# Patient Record
Sex: MALE | Race: WHITE | Employment: OTHER | URBAN - METROPOLITAN AREA
[De-identification: names, ages, dates, MRNs, and addresses within clinical notes are randomized per-mention and may not be internally consistent; named-entity substitution may affect disease eponyms.]

---

## 2019-07-29 ENCOUNTER — HOSPITAL ENCOUNTER (INPATIENT)
Age: 69
LOS: 4 days | Discharge: HOME HEALTH | DRG: 571 | End: 2019-08-02
Attending: EMERGENCY MEDICINE | Admitting: HOSPITALIST
Payer: MEDICARE

## 2019-07-29 ENCOUNTER — APPOINTMENT (OUTPATIENT)
Dept: GENERAL RADIOLOGY | Age: 69
DRG: 571 | End: 2019-07-29
Attending: EMERGENCY MEDICINE
Payer: MEDICARE

## 2019-07-29 DIAGNOSIS — S81.802A WOUND OF LEFT LEG, INITIAL ENCOUNTER: Primary | ICD-10-CM

## 2019-07-29 DIAGNOSIS — I48.21 PERMANENT ATRIAL FIBRILLATION (HCC): ICD-10-CM

## 2019-07-29 DIAGNOSIS — L03.116 CELLULITIS OF LEFT LEG: ICD-10-CM

## 2019-07-29 PROBLEM — L03.90 CELLULITIS: Status: ACTIVE | Noted: 2019-07-29

## 2019-07-29 PROBLEM — G47.33 OSA (OBSTRUCTIVE SLEEP APNEA): Status: ACTIVE | Noted: 2019-07-29

## 2019-07-29 PROBLEM — I48.91 A-FIB (HCC): Status: ACTIVE | Noted: 2019-07-29

## 2019-07-29 LAB
ALBUMIN SERPL-MCNC: 3.9 G/DL (ref 3.4–5)
ALBUMIN/GLOB SERPL: 1.2 {RATIO} (ref 0.8–1.7)
ALP SERPL-CCNC: 65 U/L (ref 45–117)
ALT SERPL-CCNC: 21 U/L (ref 16–61)
ANION GAP SERPL CALC-SCNC: 3 MMOL/L (ref 3–18)
AST SERPL-CCNC: 23 U/L (ref 10–38)
BASOPHILS # BLD: 0 K/UL (ref 0–0.1)
BASOPHILS NFR BLD: 0 % (ref 0–2)
BILIRUB DIRECT SERPL-MCNC: 0.2 MG/DL (ref 0–0.2)
BILIRUB SERPL-MCNC: 0.6 MG/DL (ref 0.2–1)
BUN SERPL-MCNC: 20 MG/DL (ref 7–18)
BUN/CREAT SERPL: 22 (ref 12–20)
CALCIUM SERPL-MCNC: 8.9 MG/DL (ref 8.5–10.1)
CHLORIDE SERPL-SCNC: 108 MMOL/L (ref 100–111)
CO2 SERPL-SCNC: 29 MMOL/L (ref 21–32)
CREAT SERPL-MCNC: 0.89 MG/DL (ref 0.6–1.3)
DIFFERENTIAL METHOD BLD: ABNORMAL
EOSINOPHIL # BLD: 0.1 K/UL (ref 0–0.4)
EOSINOPHIL NFR BLD: 1 % (ref 0–5)
ERYTHROCYTE [DISTWIDTH] IN BLOOD BY AUTOMATED COUNT: 13.5 % (ref 11.6–14.5)
GLOBULIN SER CALC-MCNC: 3.2 G/DL (ref 2–4)
GLUCOSE SERPL-MCNC: 100 MG/DL (ref 74–99)
HCT VFR BLD AUTO: 38.5 % (ref 36–48)
HGB BLD-MCNC: 12.7 G/DL (ref 13–16)
LYMPHOCYTES # BLD: 1.4 K/UL (ref 0.9–3.6)
LYMPHOCYTES NFR BLD: 16 % (ref 21–52)
MCH RBC QN AUTO: 31.7 PG (ref 24–34)
MCHC RBC AUTO-ENTMCNC: 33 G/DL (ref 31–37)
MCV RBC AUTO: 96 FL (ref 74–97)
MONOCYTES # BLD: 0.9 K/UL (ref 0.05–1.2)
MONOCYTES NFR BLD: 11 % (ref 3–10)
NEUTS SEG # BLD: 6.1 K/UL (ref 1.8–8)
NEUTS SEG NFR BLD: 72 % (ref 40–73)
PLATELET # BLD AUTO: 169 K/UL (ref 135–420)
PMV BLD AUTO: 9.3 FL (ref 9.2–11.8)
POTASSIUM SERPL-SCNC: 4 MMOL/L (ref 3.5–5.5)
PROT SERPL-MCNC: 7.1 G/DL (ref 6.4–8.2)
RBC # BLD AUTO: 4.01 M/UL (ref 4.7–5.5)
SODIUM SERPL-SCNC: 140 MMOL/L (ref 136–145)
WBC # BLD AUTO: 8.5 K/UL (ref 4.6–13.2)

## 2019-07-29 PROCEDURE — 87040 BLOOD CULTURE FOR BACTERIA: CPT

## 2019-07-29 PROCEDURE — 99285 EMERGENCY DEPT VISIT HI MDM: CPT

## 2019-07-29 PROCEDURE — 96365 THER/PROPH/DIAG IV INF INIT: CPT

## 2019-07-29 PROCEDURE — 65270000029 HC RM PRIVATE

## 2019-07-29 PROCEDURE — 74011250636 HC RX REV CODE- 250/636: Performed by: EMERGENCY MEDICINE

## 2019-07-29 PROCEDURE — 80076 HEPATIC FUNCTION PANEL: CPT

## 2019-07-29 PROCEDURE — 85025 COMPLETE CBC W/AUTO DIFF WBC: CPT

## 2019-07-29 PROCEDURE — 74011000258 HC RX REV CODE- 258: Performed by: EMERGENCY MEDICINE

## 2019-07-29 PROCEDURE — 73590 X-RAY EXAM OF LOWER LEG: CPT

## 2019-07-29 PROCEDURE — 74011250636 HC RX REV CODE- 250/636: Performed by: INTERNAL MEDICINE

## 2019-07-29 PROCEDURE — 74011000258 HC RX REV CODE- 258: Performed by: INTERNAL MEDICINE

## 2019-07-29 PROCEDURE — 74011250637 HC RX REV CODE- 250/637: Performed by: HOSPITALIST

## 2019-07-29 PROCEDURE — 80048 BASIC METABOLIC PNL TOTAL CA: CPT

## 2019-07-29 RX ORDER — PRAVASTATIN SODIUM 20 MG/1
40 TABLET ORAL
Status: DISCONTINUED | OUTPATIENT
Start: 2019-07-29 | End: 2019-08-02 | Stop reason: HOSPADM

## 2019-07-29 RX ORDER — PRAVASTATIN SODIUM 40 MG/1
40 TABLET ORAL
COMMUNITY

## 2019-07-29 RX ORDER — NAPROXEN SODIUM 220 MG
220 TABLET ORAL 2 TIMES DAILY WITH MEALS
COMMUNITY
End: 2019-08-02

## 2019-07-29 RX ORDER — ACETAMINOPHEN 325 MG/1
650 TABLET ORAL
Status: DISCONTINUED | OUTPATIENT
Start: 2019-07-29 | End: 2019-08-02 | Stop reason: HOSPADM

## 2019-07-29 RX ORDER — FLUTICASONE PROPIONATE 110 UG/1
1 AEROSOL, METERED RESPIRATORY (INHALATION) EVERY 12 HOURS
COMMUNITY

## 2019-07-29 RX ORDER — DABIGATRAN ETEXILATE 150 MG/1
150 CAPSULE ORAL EVERY 12 HOURS
COMMUNITY

## 2019-07-29 RX ORDER — ACYCLOVIR 800 MG/1
400 TABLET ORAL 2 TIMES DAILY
Status: DISCONTINUED | OUTPATIENT
Start: 2019-07-29 | End: 2019-08-02 | Stop reason: HOSPADM

## 2019-07-29 RX ORDER — ACYCLOVIR 400 MG/1
400 TABLET ORAL
COMMUNITY

## 2019-07-29 RX ORDER — VANCOMYCIN 2 GRAM/500 ML IN 0.9 % SODIUM CHLORIDE INTRAVENOUS
2000 ONCE
Status: COMPLETED | OUTPATIENT
Start: 2019-07-29 | End: 2019-07-29

## 2019-07-29 RX ORDER — HEPARIN SODIUM 5000 [USP'U]/ML
5000 INJECTION, SOLUTION INTRAVENOUS; SUBCUTANEOUS EVERY 8 HOURS
Status: DISCONTINUED | OUTPATIENT
Start: 2019-07-30 | End: 2019-08-02 | Stop reason: HOSPADM

## 2019-07-29 RX ORDER — FLUTICASONE PROPIONATE 110 UG/1
1 AEROSOL, METERED RESPIRATORY (INHALATION)
Status: CANCELLED | OUTPATIENT
Start: 2019-07-29

## 2019-07-29 RX ADMIN — PRAVASTATIN SODIUM 40 MG: 20 TABLET ORAL at 22:19

## 2019-07-29 RX ADMIN — ACYCLOVIR 400 MG: 800 TABLET ORAL at 18:08

## 2019-07-29 RX ADMIN — VANCOMYCIN HYDROCHLORIDE 2000 MG: 10 INJECTION, POWDER, LYOPHILIZED, FOR SOLUTION INTRAVENOUS at 13:25

## 2019-07-29 RX ADMIN — PIPERACILLIN SODIUM,TAZOBACTAM SODIUM 3.38 G: 3; .375 INJECTION, POWDER, FOR SOLUTION INTRAVENOUS at 18:09

## 2019-07-29 RX ADMIN — PIPERACILLIN, TAZOBACTAM 4.5 G: 4; .5 INJECTION, POWDER, LYOPHILIZED, FOR SOLUTION INTRAVENOUS at 12:38

## 2019-07-29 NOTE — PROGRESS NOTES
*ATTENTION:  This note has been created by a medical student for educational purposes only. Please do not refer to the content of this note for clinical decision-making, billing, or other purposes. Please see attending physicians note to obtain clinical information on this patient. *       *ATTENTION:  This note has been created by a medical student for educational purposes only. Please do not refer to the content of this note for clinical decision-making, billing, or other purposes. Please see attending physicians note to obtain clinical information on this patient. *       Student Progress Note  Please refer to attendings daily rounding note for full details      Patient: Lino Robb MRN: [id number]  CSN: [id number]    YOB: 1950  Age: 71 y.o. Sex: male    DOA: 7/29/2019 LOS:  LOS: 0 days          Chief Complaint:  L leg wound    Subjective:      HPI: Pt is a 70 yo male w/ Afib who presents today with a L leg wound that hasn't healed in the past 6 weeks. He fell off of a ladder 6 weeks ago and scrapped his left leg on it. He saw his PCP and urgent care and was prescribed 2 rounds of Clindamycin. He initially saw improvement, but over the past 2 weeks the wound worsened and on Saturday (2 days ago) he noticed bloody drainage and a foul odor. This morning he noticed some redness around the wound. PMH: A fib   Meds: Pradexa  SH: Denies tobacco. Drinks 2-3 glasses of wine a week. Denies recreational drugs       Objective:      Visit Vitals  /89   Pulse 71   Temp 98.9 °F (37.2 °C)   Resp 18   Ht 6' 1\" (1.854 m)   Wt 96.6 kg (213 lb)   SpO2 98%   BMI 28.10 kg/m²         Physical Exam:  Gen: Pt is well-appearing in no acute distress  HEENT:   CV: RRR. No murmurs, rubs, or gallops   Resp: CTA  Abd: Soft. Non tender to palpation. Normoactive bowel sounds. Skin:  L leg: 5x6 deep laceration with surrounding erythema. Neuro: Sensation intact in LE. Motor Strength 5/5.       I have reviewed the patient's Labs and Radiology studies. Assessment:   1.       Plan:         Ricardo Rice  7/29/2019  12:21 PM

## 2019-07-29 NOTE — ED PROVIDER NOTES
EMERGENCY DEPARTMENT HISTORY AND PHYSICAL EXAM    1:05 PM late start of note      Date: 7/29/2019  Patient Name: Miya Robb    History of Presenting Illness     Chief Complaint   Patient presents with    Skin Problem         History Provided By: patient    Additional History (Context): Miya Robb is a 71 y.o. male presents with wound of the left leg lateral aspect, after falling off a ladder and suffering an abrasion 6 weeks ago. He has had 2 rounds of clindamycin as an outpatient. No fevers. Minimal pain, just some burning sensation or he says today part of the scab came off and copious watery and purulent drainage came out. He is supposed to follow-up with wound care. Osiris Vaz PCP: Robles Arreola MD    Chief Complaint:   Duration:    Timing:    Location:   Quality:   Severity:   Modifying Factors:   Associated Symptoms:       Current Facility-Administered Medications   Medication Dose Route Frequency Provider Last Rate Last Dose    vancomycin (VANCOCIN) 2000 mg in  ml infusion  2,000 mg IntraVENous ONCE Cindy Oliveira MD        piperacillin-tazobactam (ZOSYN) 4.5 g in 0.9% sodium chloride (MBP/ADV) 100 mL MBP  4.5 g IntraVENous Q6H Geno SHI  mL/hr at 07/29/19 1238 4.5 g at 07/29/19 1238    VANCOMYCIN INFORMATION NOTE   Other Rx Dosing/Monitoring Jr Dunn MD         Current Outpatient Medications   Medication Sig Dispense Refill    dabigatran etexilate (PRADAXA) 150 mg capsule Take 150 mg by mouth every twelve (12) hours.  acyclovir (ZOVIRAX) 400 mg tablet Take 400 mg by mouth every four (4) hours (while awake).  pravastatin (PRAVACHOL) 40 mg tablet Take 40 mg by mouth nightly.  naproxen sodium (ALEVE) 220 mg tablet Take 220 mg by mouth two (2) times daily (with meals).  fluticasone propionate (FLOVENT HFA) 110 mcg/actuation inhaler Take  by inhalation.          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 No pertinent family history. Social History:  Social History     Tobacco Use    Smoking status: Never Smoker    Smokeless tobacco: Never Used   Substance Use Topics    Alcohol use: Yes     Comment: soc    Drug use: Never       Allergies: Allergies   Allergen Reactions    Codeine Nausea and Vomiting         Review of Systems     Review of Systems   Constitutional: Negative for diaphoresis and fever. HENT: Negative for congestion and sore throat. Eyes: Negative for pain and itching. Respiratory: Negative for cough and shortness of breath. Cardiovascular: Negative for chest pain and palpitations. Gastrointestinal: Negative for abdominal pain and diarrhea. Endocrine: Negative for polydipsia and polyuria. Genitourinary: Negative for dysuria and hematuria. Musculoskeletal: Negative for arthralgias and myalgias. Skin: Positive for wound. Negative for rash. Neurological: Negative for seizures and syncope. Hematological: Does not bruise/bleed easily. Psychiatric/Behavioral: Negative for agitation and hallucinations. Physical Exam       Patient Vitals for the past 12 hrs:   Temp Pulse Resp BP SpO2   07/29/19 1200    159/89 98 %   07/29/19 1145    155/86 98 %   07/29/19 1056 98.9 °F (37.2 °C) 71 18 (!) 144/93 97 %       Physical Exam   Constitutional: He appears well-developed and well-nourished. HENT:   Head: Normocephalic and atraumatic. Eyes: Conjunctivae are normal. No scleral icterus. Neck: Normal range of motion. Neck supple. No JVD present. Cardiovascular: Normal rate, regular rhythm and normal heart sounds.    4 intact extremity pulses   Pulmonary/Chest: Effort normal and breath sounds normal. Abdominal: Soft. He exhibits no mass. There is no tenderness. Musculoskeletal: Normal range of motion. Lateral aspect of the left leg has about a 4 cm x 5 cm wound with exposed muscle tissue. There is large rim of surrounding erythema. No streaking up the leg. No severe pain. No significant drainage or bleeding. Contents of the wound appears beefy red and rather healthy. Extremities neurovascularly intact. Lymphadenopathy:     He has no cervical adenopathy. Neurological: He is alert. Skin: Skin is warm and dry. Nursing note and vitals reviewed. Diagnostic Study Results   Labs -  Recent Results (from the past 12 hour(s))   CBC WITH AUTOMATED DIFF    Collection Time: 07/29/19 11:55 AM   Result Value Ref Range    WBC 8.5 4.6 - 13.2 K/uL    RBC 4.01 (L) 4.70 - 5.50 M/uL    HGB 12.7 (L) 13.0 - 16.0 g/dL    HCT 38.5 36.0 - 48.0 %    MCV 96.0 74.0 - 97.0 FL    MCH 31.7 24.0 - 34.0 PG    MCHC 33.0 31.0 - 37.0 g/dL    RDW 13.5 11.6 - 14.5 %    PLATELET 214 357 - 595 K/uL    MPV 9.3 9.2 - 11.8 FL    NEUTROPHILS 72 40 - 73 %    LYMPHOCYTES 16 (L) 21 - 52 %    MONOCYTES 11 (H) 3 - 10 %    EOSINOPHILS 1 0 - 5 %    BASOPHILS 0 0 - 2 %    ABS. NEUTROPHILS 6.1 1.8 - 8.0 K/UL    ABS. LYMPHOCYTES 1.4 0.9 - 3.6 K/UL    ABS. MONOCYTES 0.9 0.05 - 1.2 K/UL    ABS. EOSINOPHILS 0.1 0.0 - 0.4 K/UL    ABS. BASOPHILS 0.0 0.0 - 0.1 K/UL    DF AUTOMATED     METABOLIC PANEL, BASIC    Collection Time: 07/29/19 11:55 AM   Result Value Ref Range    Sodium 140 136 - 145 mmol/L    Potassium 4.0 3.5 - 5.5 mmol/L    Chloride 108 100 - 111 mmol/L    CO2 29 21 - 32 mmol/L    Anion gap 3 3.0 - 18 mmol/L    Glucose 100 (H) 74 - 99 mg/dL    BUN 20 (H) 7.0 - 18 MG/DL    Creatinine 0.89 0.6 - 1.3 MG/DL    BUN/Creatinine ratio 22 (H) 12 - 20      GFR est AA >60 >60 ml/min/1.73m2    GFR est non-AA >60 >60 ml/min/1.73m2    Calcium 8.9 8.5 - 10.1 MG/DL       Radiologic Studies -   XR TIB/FIB LT   Final Result   IMPRESSION:   1.  Large ulcerative skin lesion. There are some changes of cellulitis adjacent   to this. 2. No underlying bony abnormality. No plain film evidence of osteomyelitis. If   that remains a strong concern, MRI or bone scan on a routine basis could be   performed. 3. Knee replacement. Xr Tib/fib Lt    Result Date: 7/29/2019  Left tibia/fibula, 4 AP and lateral views: INDICATION: Pain with injury. There is a large irregular ulcerative skin lesion along the lateral distal calf soft tissues. Mild adjacent edema in the subcutaneous fat and loss of tissue planes is likely cellulitis. Underlying fibula is unremarkable. Ankle is normal except for very mild degenerative change. Vascular calcification of the foot vessels. Evidence of prior knee replacement. IMPRESSION: 1. Large ulcerative skin lesion. There are some changes of cellulitis adjacent to this. 2. No underlying bony abnormality. No plain film evidence of osteomyelitis. If that remains a strong concern, MRI or bone scan on a routine basis could be performed. 3. Knee replacement. Medications ordered:   Medications   vancomycin (VANCOCIN) 2000 mg in  ml infusion (has no administration in time range)   piperacillin-tazobactam (ZOSYN) 4.5 g in 0.9% sodium chloride (MBP/ADV) 100 mL MBP (4.5 g IntraVENous New Bag 7/29/19 1238)   VANCOMYCIN INFORMATION NOTE (has no administration in time range)         Medical Decision Making   Initial Medical Decision Making and DDx:     X-ray to evaluate for osteo-was negative. No leukocytosis or fever to indicate sepsis. Stable vital signs. IV antibiotics and admit the patient. Dr Day Harkins contacted via [facility] text will see and admit the patient. ED Course: Progress Notes, Reevaluation, and Consults:         I am the first provider for this patient. I reviewed the vital signs, available nursing notes, past medical history, past surgical history, family history and social history.     Patient Vitals for the past 12 hrs:   Temp Pulse Resp BP SpO2   07/29/19 1200    159/89 98 %   07/29/19 1145    155/86 98 %   07/29/19 1056 98.9 °F (37.2 °C) 71 18 (!) 144/93 97 %       Vital Signs-Reviewed the patient's vital signs. Pulse Oximetry Analysis, Cardiac Monitor, 12 lead ekg:   Interpreted by the EP. Records Reviewed: Nursing notes reviewed (Time of Review: 1:05 PM)    Procedures:   Critical Care Time:   Aspirin: (was aspirin given for stroke?)    Diagnosis     Clinical Impression:   1. Wound of left leg, initial encounter    2. Permanent atrial fibrillation (HCC)    3. Cellulitis of left leg        Disposition: Admitted      Follow-up Information    None          Patient's Medications   Start Taking    No medications on file   Continue Taking    ACYCLOVIR (ZOVIRAX) 400 MG TABLET    Take 400 mg by mouth every four (4) hours (while awake). DABIGATRAN ETEXILATE (PRADAXA) 150 MG CAPSULE    Take 150 mg by mouth every twelve (12) hours. FLUTICASONE PROPIONATE (FLOVENT HFA) 110 MCG/ACTUATION INHALER    Take  by inhalation. NAPROXEN SODIUM (ALEVE) 220 MG TABLET    Take 220 mg by mouth two (2) times daily (with meals). PRAVASTATIN (PRAVACHOL) 40 MG TABLET    Take 40 mg by mouth nightly.    These Medications have changed    No medications on file   Stop Taking    No medications on file     _______________________________    Notes:    Stormy Souza MD using Dragon dictation      _______________________________

## 2019-07-29 NOTE — H&P
History and Physical    Patient: Nacho Leigh               Sex: male          DOA: 7/29/2019       YOB: 1950      Age:  71 y.o.        LOS:  LOS: 0 days        CHIEF COMPLAINT: Skin Problem    Assessment/Plan:     Active Problems:    Cellulitis (7/29/2019)      A-fib ([address] (7/29/2019)      KARLO (obstructive sleep apnea) (7/29/2019)      PLAN:  Cont broad spectrum IV atbx  MRI Tib/Fib to r/o osteo  Wound care consult  ID consult  PRS consult given deep wound, assess need for surgical debridement  F/u blood cultures  Hold pradaxa for now in case pt needs surgery  Nocturnal CPAP    DVT ppx: sq heparin while pradaxa being held, SCD      HPI:     Nacho Leigh is a 71 y.o. male with PMH as below who presents due to worsening LLE wound. Pt states that 6 weeks ago he fell off a ladder and scraped his LLE while falling. He said initially he had superficial abrasions to the LLE. Over time he developed redness in the LLE and went to urgent care who recommended antibiotic ointment. The redness did not improve and he saw his PCP again who recommended clindamycin which he took for 2 week course, completing it about 5 days ago. Today he said part of his scab came out and he saw purulent foul smelling drainage and large deep wound, so he presented to the ED for evaluation. He said he does have some pain but not significant, he is able to ambulate, and does having tingling around the wound. He also had some intermittent swelling for which he had venous duplex ultrasound which was negative for DVT. He denies fevers or chills, nausea or vomiting. In the ED he has started on IV atbx and admitted for further management.     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 No pertinent family history. Review of Systems  Review of Systems   Constitutional: Negative for chills and fever. HENT: Negative for congestion and hearing loss. Eyes: Negative for blurred vision and double vision. Respiratory: Negative for cough and shortness of breath. Cardiovascular: Negative for chest pain and palpitations. Gastrointestinal: Negative for abdominal pain, nausea and vomiting. Genitourinary: Negative for dysuria and hematuria. Musculoskeletal: Positive for myalgias (left leg). Negative for falls. Skin: Negative for rash. Neurological: Negative for dizziness and focal weakness. Psychiatric/Behavioral: Negative. Objective:      Visit Vitals  /75   Pulse 71   Temp 98.9 °F (37.2 °C)   Resp 18   Ht 6' 1\" (1.854 m)   Wt 96.6 kg (213 lb)   SpO2 100%   BMI 28.10 kg/m²       Physical Exam:  Ears: hearing is intact  Eyes: Icterus is not present  Lungs: clear to auscultation bilaterally  Heart: regular rate and rhythm, S1, S2 normal, no murmur, click, rub or gallop  Gastrointestinal: soft, non-tender. Bowel sounds normal. No masses,  no organomegaly  Neurological:  New Focal Deficits are not present  Ext: Left lower extremity with 4in x 4in deep wound, half of which is covered with black eschar , see picture in Media tab.   Psychiatric:  Mood is stable    Laboratory Studies:  CMP:   Lab Results   Component Value Date/Time     07/29/2019 11:55 AM    K 4.0 07/29/2019 11:55 AM     07/29/2019 11:55 AM    CO2 29 07/29/2019 11:55 AM    AGAP 3 07/29/2019 11:55 AM     (H) 07/29/2019 11:55 AM    BUN 20 (H) 07/29/2019 11:55 AM    CREA 0.89 07/29/2019 11:55 AM    GFRAA >60 07/29/2019 11:55 AM    GFRNA >60 07/29/2019 11:55 AM    CA 8.9 07/29/2019 11:55 AM    ALB 3.9 07/29/2019 11:55 AM    TP 7.1 07/29/2019 11:55 AM    GLOB 3.2 07/29/2019 11:55 AM    AGRAT 1.2 07/29/2019 11:55 AM    SGOT 23 07/29/2019 11:55 AM    ALT 21 07/29/2019 11:55 AM     CBC:   Lab Results   Component Value Date/Time    WBC 8.5 07/29/2019 11:55 AM    HGB 12.7 (L) 07/29/2019 11:55 AM    HCT 38.5 07/29/2019 11:55 AM     07/29/2019 11:55 AM       Imaging:  XR TIB/FIB LT   Final Result   IMPRESSION:   1. Large ulcerative skin lesion. There are some changes of cellulitis adjacent   to this. 2. No underlying bony abnormality. No plain film evidence of osteomyelitis. If   that remains a strong concern, MRI or bone scan on a routine basis could be   performed. 3. Knee replacement.       MRI TIB/FIB LT W WO CONT    (Results Pending)

## 2019-07-29 NOTE — ED TRIAGE NOTES
Pt presents with wound to outer LLE. Wound is approx 4x4 occurred after falling off ladder 6/16. Pt seen at urgent care and by PCP. Wound clinic 8/2. Today, pt states scab has opened and there is a foul odor w/ bloody drainage. Noticeable swelling and redness shin to ankle. 2 rounds of Clindamycin from urgent care.  Denies fever, chills, N/V

## 2019-07-29 NOTE — ED NOTES
TRANSFER - ED to INPATIENT REPORT:    SBAR report made available to receiving floor on this patient being transferred to [address] [place] [address] ([id number])  for routine progression of care       Admitting diagnosis Cellulitis [L03.90]    Information from the following report(s) SBAR, ED Summary, MAR and Recent Results was made available to receiving floor. Lines:   Peripheral IV 07/29/19 Left Antecubital (Active)   Site Assessment Clean, dry, & intact 7/29/2019 11:58 AM   Phlebitis Assessment 0 7/29/2019 11:58 AM   Infiltration Assessment 0 7/29/2019 11:58 AM   Dressing Status Clean, dry, & intact 7/29/2019 11:58 AM       Peripheral IV 07/29/19 Right Antecubital (Active)   Site Assessment Clean, dry, & intact 7/29/2019 12:10 PM   Phlebitis Assessment 0 7/29/2019 12:10 PM   Infiltration Assessment 0 7/29/2019 12:10 PM   Dressing Status Clean, dry, & intact 7/29/2019 12:10 PM   Dressing Type Transparent 7/29/2019 12:10 PM   Hub Color/Line Status Green;Flushed 7/29/2019 12:10 PM   Action Taken Blood drawn 7/29/2019 12:10 PM        Medication list confirmed with patient    Opportunity for questions and clarification was provided.       Patient is oriented to time, place, person and situation patient needs wound care  Patient is  continent and ambulatory with assist     Valuables transported with patient     Patient transported with:   Tech    MAP (Monitor): 91 =Monitored (most recent)  Vitals w/ MEWS Score (last day)     Date/Time MEWS Score Pulse Resp Temp BP Level of Consciousness SpO2    07/29/19 1513        98.7 °F (37.1 °C)    Alert      07/29/19 1500  1  69  18  98.7 °F (37.1 °C)  125/87  Alert  100 %    07/29/19 1400          141/75    100 %    07/29/19 1345          134/64    98 %    07/29/19 1315          132/81        07/29/19 1300          132/81    98 %    07/29/19 1245          138/83    96 %    07/29/19 1230          143/60    98 %    07/29/19 1215          141/83    99 % 07/29/19 1200          159/89    98 %    07/29/19 1145          155/86    98 %    07/29/19 1056  1  71  18  98.9 °F (37.2 °C)  (!) 144/93  Alert  97 %              Septic Patients:     Lactic Acid  No results found for: LACPOC (Most recent on top)  Repeat drawn: NO Time: not septic     ALL LACTIC ACIDS GREATER THAN 2 MUST BE REPEATED POC WITHIN 4 HOURS OR PRIOR TO ADMISSION               Total Fluid Bolus initiated and documented on MAR: NO    All ordered antibiotics initiated within first 3 hours of TIME ZERO?   NO      Valuables,:  Watch, ring, shorts, shirt, shoes, glasses

## 2019-07-30 ENCOUNTER — APPOINTMENT (OUTPATIENT)
Dept: MRI IMAGING | Age: 69
DRG: 571 | End: 2019-07-30
Attending: HOSPITALIST
Payer: MEDICARE

## 2019-07-30 LAB
ANION GAP SERPL CALC-SCNC: 4 MMOL/L (ref 3–18)
BASOPHILS # BLD: 0 K/UL (ref 0–0.1)
BASOPHILS NFR BLD: 0 % (ref 0–2)
BUN SERPL-MCNC: 16 MG/DL (ref 7–18)
BUN/CREAT SERPL: 17 (ref 12–20)
CALCIUM SERPL-MCNC: 8 MG/DL (ref 8.5–10.1)
CHLORIDE SERPL-SCNC: 108 MMOL/L (ref 100–111)
CO2 SERPL-SCNC: 28 MMOL/L (ref 21–32)
CREAT SERPL-MCNC: 0.95 MG/DL (ref 0.6–1.3)
DIFFERENTIAL METHOD BLD: ABNORMAL
EOSINOPHIL # BLD: 0.1 K/UL (ref 0–0.4)
EOSINOPHIL NFR BLD: 2 % (ref 0–5)
ERYTHROCYTE [DISTWIDTH] IN BLOOD BY AUTOMATED COUNT: 13.7 % (ref 11.6–14.5)
GLUCOSE SERPL-MCNC: 110 MG/DL (ref 74–99)
HCT VFR BLD AUTO: 35.2 % (ref 36–48)
HGB BLD-MCNC: 11.6 G/DL (ref 13–16)
LYMPHOCYTES # BLD: 1.2 K/UL (ref 0.9–3.6)
LYMPHOCYTES NFR BLD: 15 % (ref 21–52)
MAGNESIUM SERPL-MCNC: 2.2 MG/DL (ref 1.6–2.6)
MCH RBC QN AUTO: 31.8 PG (ref 24–34)
MCHC RBC AUTO-ENTMCNC: 33 G/DL (ref 31–37)
MCV RBC AUTO: 96.4 FL (ref 74–97)
MONOCYTES # BLD: 0.8 K/UL (ref 0.05–1.2)
MONOCYTES NFR BLD: 10 % (ref 3–10)
NEUTS SEG # BLD: 6.1 K/UL (ref 1.8–8)
NEUTS SEG NFR BLD: 73 % (ref 40–73)
PHOSPHATE SERPL-MCNC: 3.4 MG/DL (ref 2.5–4.9)
PLATELET # BLD AUTO: 166 K/UL (ref 135–420)
PMV BLD AUTO: 10.1 FL (ref 9.2–11.8)
POTASSIUM SERPL-SCNC: 3.8 MMOL/L (ref 3.5–5.5)
RBC # BLD AUTO: 3.65 M/UL (ref 4.7–5.5)
SODIUM SERPL-SCNC: 140 MMOL/L (ref 136–145)
WBC # BLD AUTO: 8.2 K/UL (ref 4.6–13.2)

## 2019-07-30 PROCEDURE — 65270000029 HC RM PRIVATE

## 2019-07-30 PROCEDURE — 85025 COMPLETE CBC W/AUTO DIFF WBC: CPT

## 2019-07-30 PROCEDURE — 77030012935 HC DRSG AQUACEL BMS -B

## 2019-07-30 PROCEDURE — 74011000258 HC RX REV CODE- 258: Performed by: INTERNAL MEDICINE

## 2019-07-30 PROCEDURE — 73720 MRI LWR EXTREMITY W/O&W/DYE: CPT

## 2019-07-30 PROCEDURE — 74011250636 HC RX REV CODE- 250/636: Performed by: HOSPITALIST

## 2019-07-30 PROCEDURE — 74011250636 HC RX REV CODE- 250/636: Performed by: INTERNAL MEDICINE

## 2019-07-30 PROCEDURE — 74011636320 HC RX REV CODE- 636/320: Performed by: HOSPITALIST

## 2019-07-30 PROCEDURE — 83735 ASSAY OF MAGNESIUM: CPT

## 2019-07-30 PROCEDURE — 74011250637 HC RX REV CODE- 250/637: Performed by: HOSPITALIST

## 2019-07-30 PROCEDURE — 36415 COLL VENOUS BLD VENIPUNCTURE: CPT

## 2019-07-30 PROCEDURE — 80048 BASIC METABOLIC PNL TOTAL CA: CPT

## 2019-07-30 PROCEDURE — A9575 INJ GADOTERATE MEGLUMI 0.1ML: HCPCS | Performed by: HOSPITALIST

## 2019-07-30 PROCEDURE — 84100 ASSAY OF PHOSPHORUS: CPT

## 2019-07-30 PROCEDURE — 77030011256 HC DRSG MEPILEX <16IN NO BORD MOLN -A

## 2019-07-30 RX ADMIN — PIPERACILLIN SODIUM,TAZOBACTAM SODIUM 3.38 G: 3; .375 INJECTION, POWDER, FOR SOLUTION INTRAVENOUS at 16:34

## 2019-07-30 RX ADMIN — SODIUM CHLORIDE 1250 MG: 900 INJECTION, SOLUTION INTRAVENOUS at 14:19

## 2019-07-30 RX ADMIN — ACYCLOVIR 400 MG: 800 TABLET ORAL at 09:36

## 2019-07-30 RX ADMIN — ACETAMINOPHEN 650 MG: 325 TABLET, FILM COATED ORAL at 16:35

## 2019-07-30 RX ADMIN — ACYCLOVIR 400 MG: 800 TABLET ORAL at 18:49

## 2019-07-30 RX ADMIN — HEPARIN SODIUM 5000 UNITS: 5000 INJECTION INTRAVENOUS; SUBCUTANEOUS at 05:50

## 2019-07-30 RX ADMIN — HEPARIN SODIUM 5000 UNITS: 5000 INJECTION INTRAVENOUS; SUBCUTANEOUS at 21:40

## 2019-07-30 RX ADMIN — PRAVASTATIN SODIUM 40 MG: 20 TABLET ORAL at 21:40

## 2019-07-30 RX ADMIN — PIPERACILLIN SODIUM,TAZOBACTAM SODIUM 3.38 G: 3; .375 INJECTION, POWDER, FOR SOLUTION INTRAVENOUS at 09:37

## 2019-07-30 RX ADMIN — PIPERACILLIN SODIUM,TAZOBACTAM SODIUM 3.38 G: 3; .375 INJECTION, POWDER, FOR SOLUTION INTRAVENOUS at 01:08

## 2019-07-30 RX ADMIN — SODIUM CHLORIDE 1250 MG: 900 INJECTION, SOLUTION INTRAVENOUS at 01:08

## 2019-07-30 RX ADMIN — HEPARIN SODIUM 5000 UNITS: 5000 INJECTION INTRAVENOUS; SUBCUTANEOUS at 14:00

## 2019-07-30 RX ADMIN — GADOTERATE MEGLUMINE 20 ML: 376.9 INJECTION INTRAVENOUS at 11:16

## 2019-07-30 NOTE — ROUTINE PROCESS
Board updated. Assessment completed. No distress voiced/noted. Pain documented. Call bell within reach. Will continue to monitor.

## 2019-07-30 NOTE — PROGRESS NOTES
I was not able to assess the patient at this time and will perform a follow up visit in a few days. Robby Mccauley M.Div.   , [address] [facility]: [phone]/[facility]: [phone]

## 2019-07-30 NOTE — PROGRESS NOTES
Problem: Pain  Goal: *Control of Pain  Outcome: Progressing Towards Goal  Goal: *PALLIATIVE CARE:  Alleviation of Pain  Outcome: Progressing Towards Goal     Problem: Pressure Injury - Risk of  Goal: *Prevention of pressure injury  Description  Document Sonu Scale and appropriate interventions in the flowsheet. Outcome: Progressing Towards Goal  Note:   Pressure Injury Interventions: Activity Interventions: Pressure redistribution bed/mattress(bed type)    Mobility Interventions: HOB 30 degrees or less    Nutrition Interventions: Document food/fluid/supplement intake                     Problem: Lower Extremity Wound Care  Goal: *Non-infected wound: Improvement of existing wound, absence of infection, and maintenance of skin integrity  Outcome: Progressing Towards Goal  Goal: *Infected Wound: Prevention of further infection and promotion of healing  Description  Infection control procedures (eg: clean dressings, clean gloves, hand washing, precautions to isolate wound from contamination, sterile instruments used for wound debridement) should be implemented. Outcome: Progressing Towards Goal  Goal: Interventions  Outcome: Progressing Towards Goal     Problem: Falls - Risk of  Goal: *Absence of Falls  Description  Document Krystyna English Fall Risk and appropriate interventions in the flowsheet.   Outcome: Progressing Towards Goal  Note:   Fall Risk Interventions:  Mobility Interventions: Bed/chair exit alarm         Medication Interventions: Bed/chair exit alarm    Elimination Interventions: Call light in reach    History of Falls Interventions: Bed/chair exit alarm

## 2019-07-30 NOTE — PROGRESS NOTES
Problem: Pain  Goal: *Control of Pain  Outcome: Progressing Towards Goal  Goal: *PALLIATIVE CARE:  Alleviation of Pain  Outcome: Progressing Towards Goal     Problem: Pressure Injury - Risk of  Goal: *Prevention of pressure injury  Description  Document Sonu Scale and appropriate interventions in the flowsheet. Outcome: Progressing Towards Goal  Note:   Pressure Injury Interventions: Activity Interventions: Pressure redistribution bed/mattress(bed type)    Mobility Interventions: HOB 30 degrees or less    Nutrition Interventions: Document food/fluid/supplement intake                     Problem: Lower Extremity Wound Care  Goal: *Non-infected wound: Improvement of existing wound, absence of infection, and maintenance of skin integrity  Outcome: Progressing Towards Goal  Goal: *Infected Wound: Prevention of further infection and promotion of healing  Description  Infection control procedures (eg: clean dressings, clean gloves, hand washing, precautions to isolate wound from contamination, sterile instruments used for wound debridement) should be implemented. Outcome: Progressing Towards Goal  Goal: Interventions  Outcome: Progressing Towards Goal     Problem: Falls - Risk of  Goal: *Absence of Falls  Description  Document Liv Adam Fall Risk and appropriate interventions in the flowsheet.   Outcome: Progressing Towards Goal  Note:   Fall Risk Interventions:  Mobility Interventions: Bed/chair exit alarm         Medication Interventions: Bed/chair exit alarm    Elimination Interventions: Call light in reach    History of Falls Interventions: Bed/chair exit alarm

## 2019-07-30 NOTE — CONSULTS
Infectious Disease Consultation Note    Date of Admission: 7/29/2019    Date of Consultation: 7/30/2019    Referred by: Dr. Mayra Mckoy       Reason for Referral: leg cellulitis       Current Antimicrobials: Prior Antimicrobials   Vancomycin, Zosyn 7/29 - 1      Assessment / Plan:     Infected left leg wound, cellulitis  - has large cutaneous defect with surrounding cellulitis    - failed OP clindamycin x 2 weeks  - wd cx ordered not apparently sent -> continue current Vanc, Zosyn, de-escalate to po abx soon ?tomorrow  -> wound culture  -> elevate left leg above heart level  -> monitor blcx's 7/29  -> await PRS evaluation   Chronic Atrial Fibrillation    KARLO      Microbiology:     7/29 blcx NGTD x 2    Lines / Catheters:     piv    HPI:     71year-old CM w/ h/o A fib, KARLO, admitted to [facility] 7/29/2019 due to worsening left leg wound infection. He had scraped the anterolateral aspect of his left leg on a ladder rail when the ladder he was on shifted and caused him to fall. This occurred 5-6 weeks PTA. He consulted at an urgent care center and was advised to apply an antibiotic ointment but the wound worsened and he was prescribed Clindamycin by his PCP. Two weeks of this antibiotic seemed to produce some improvement but several days PTA the scab came off and there was purulent, foul-smelling drainage. With redness around the wound. He came to the [facility] ED 7/29 where xray showed a large ulcerative skin lesion and cellulitis but no evidence of osteomyelitis. MRI of the left tib/fib has been done but is not yet resulted. He was admitted and started on Vancomycin and Zosyn 7/25. Blood cultures from 7/29 are in progress.     Active Hospital Problems    Diagnosis Date Noted    Cellulitis 07/29/2019    A-fib ([address] 07/29/2019    KARLO (obstructive sleep apnea) 07/29/2019     Past Medical History:   Diagnosis Date    A-fib [facility])     Hearing loss associated with syndrome of right ear     Sleep apnea      Past Surgical History:   Procedure Laterality Date    HX ORTHOPAEDIC  2008/2010    BTKR     History reviewed. No pertinent family history. Social History     Socioeconomic History    Marital status: SINGLE     Spouse name: Not on file    Number of children: Not on file    Years of education: Not on file    Highest education level: Not on file   Occupational History    Not on file   Social Needs    Financial resource strain: Not on file    Food insecurity:     Worry: Not on file     Inability: Not on file    Transportation needs:     Medical: Not on file     Non-medical: Not on file   Tobacco Use    Smoking status: Never Smoker    Smokeless tobacco: Never Used   Substance and Sexual Activity    Alcohol use: Yes     Comment: soc    Drug use: Never    Sexual activity: Not on file   Lifestyle    Physical activity:     Days per week: Not on file     Minutes per session: Not on file    Stress: Not on file   Relationships    Social connections:     Talks on phone: Not on file     Gets together: Not on file     Attends Worship service: Not on file     Active member of club or organization: Not on file     Attends meetings of clubs or organizations: Not on file     Relationship status: Not on file    Intimate partner violence:     Fear of current or ex partner: Not on file     Emotionally abused: Not on file     Physically abused: Not on file     Forced sexual activity: Not on file   Other Topics Concern    Not on file   Social History Narrative    Not on file       Allergies:    Codeine .     Medications:     Current Facility-Administered Medications   Medication Dose Route Frequency    VANCOMYCIN INFORMATION NOTE   Other Rx Dosing/Monitoring    pravastatin (PRAVACHOL) tablet 40 mg  40 mg Oral QHS    acetaminophen (TYLENOL) tablet 650 mg  650 mg Oral Q4H PRN    vancomycin (VANCOCIN) 1,250 mg in 0.9% sodium chloride 250 mL IVPB  1,250 mg IntraVENous Q12H    [START ON 7/31/2019] VANCOMYCIN INFORMATION NOTE Other ONCE    acyclovir (ZOVIRAX) tablet 400 mg  400 mg Oral BID    heparin (porcine) injection 5,000 Units  5,000 Units SubCUTAneous Q8H    piperacillin-tazobactam (ZOSYN) 3.375 g in 0.9% sodium chloride (MBP/ADV) 100 mL MBP \"EXTENDED 4 HOUR INFUSION###\"   3.375 g IntraVENous Q8H       ROS:   A comprehensive review of systems was negative except for that written in the History of Present Illness. Physical Exam:     Temp (24hrs), Av.7 °F (37.1 °C), Min:98.1 °F (36.7 °C), Max:99.7 °F (37.6 °C)    Visit Vitals  BP (!) 139/95 (BP 1 Location: Left arm, BP Patient Position: At rest)   Pulse 91   Temp 98.3 °F (36.8 °C) Comment: vitals taken by student   Resp 16   Ht 6' 1\" (1.854 m)   Wt 96.6 kg (213 lb)   SpO2 100%   BMI 28.10 kg/m²       General: Well developed, well nourished 71 y.o.  male in no acute distress. ENT: ENT exam normal, no neck nodes or sinus tenderness  Head: normocephalic, without obvious abnormality  Mouth:  mucous membranes moist, pharynx normal without lesions  Neck: supple, symmetrical, trachea midline   Cardio:  irregularly irregular rhythm and S1, S2 normal  Chest: inspection normal - no chest wall deformities or tenderness, respiratory effort normal  Lungs: no wheezes or rales  Abdomen: soft, non-tender. Bowel sounds normal. No masses, no organomegaly.   Extremities:  Left leg with large ulcerated wound and surrounding erythema    photo by Dr. Herber Callaway        Neuro: Grossly normal    Lab results:     Chemistry  Recent Labs     19  0415 19  1155   * 100*    140   K 3.8 4.0    108   CO2 28 29   BUN 16 20*   CREA 0.95 0.89   CA 8.0* 8.9   AGAP 4 3   BUCR 17 22*   AP  --  65   TP  --  7.1   ALB  --  3.9   GLOB  --  3.2   AGRAT  --  1.2       CBC w/ Diff  Recent Labs     19  0415 19  1155   WBC 8.2 8.5   RBC 3.65* 4.01*   HGB 11.6* 12.7*   HCT 35.2* 38.5    169   GRANS 73 72   LYMPH 15* 16*   EOS 2 1       Microbiology  All Micro Results     Procedure Component Value Units Date/Time    CULTURE, BLOOD [[id number]] Collected:  07/29/19 1210    Order Status:  Completed Specimen:  Blood Updated:  07/30/19 0826     Special Requests: NO SPECIAL REQUESTS        Culture result: NO GROWTH AFTER 19 HOURS       CULTURE, BLOOD [[id number]] Collected:  07/29/19 1220    Order Status:  Completed Specimen:  Blood Updated:  07/30/19 0826     Special Requests: NO SPECIAL REQUESTS        Culture result: NO GROWTH AFTER 110 Marlene Foss MD, [facility]  Infectious Disease Specialist  Pager [phone]

## 2019-07-30 NOTE — PROGRESS NOTES
9723 Dr. Kyra Stokes wanted a follow up on the wound consult he put in yesterday (7/29/19) I called wound care and spoke with Mini Jones. She said she would let wound care know.

## 2019-07-30 NOTE — WOUND CARE
Dressing changed after MRI completed and patient back in room. Wound cleansed w/ dermal wound , Aquacel AG applied to wound bed and covered w/ Mepliex bordered foam. Dressing to be changed T/TH/Sat or PRN if dressing becomes saturated or soiled.           07/30/19 1210   Wound Leg lower Left   Date First Assessed/Time First Assessed: 07/29/19 1540   Present on Hospital Admission: Yes  Location: Leg lower  Wound Location Orientation: Left   Dressing Status New drainage;Removed   Dressing Type ABD pad;Gauze;Gauze wrap (alana)   Non-staged Wound Description Full thickness   Dressing Changed Changed/New   Dressing Type Applied Foam;Other (Comment)  (Aqaucel Ag covered w/ Mepliex bordered foam)

## 2019-07-30 NOTE — PROGRESS NOTES
Progress Note      Patient: Roxi Nelson               Sex: male          DOA: 7/29/2019       YOB: 1950      Age:  71 y.o.        LOS:  LOS: 1 day             CHIEF COMPLAINT:  Skin Problem      Assessment/Plan:     Active Problems:    Cellulitis (7/29/2019)      A-fib ([address] (7/29/2019)      KARLO (obstructive sleep apnea) (7/29/2019)           PLAN:  Cont broad spectrum IV atbx  MRI Tib/Fib to r/o osteo, pending  Wound care consult, appreciate assistance  ID consult  PRS consult given deep wound, assess need for surgical debridement  F/u blood cultures, so far no growth  Hold pradaxa for now in case pt needs surgery  Nocturnal CPAP     DVT ppx: sq heparin while pradaxa being held, SCD      Subjective:     Pt said his pain has increased after wound was dressed, he is awaiting MRI and wound care consult. He denies f/c, CP, SOB. Objective:     Visit Vitals  BP 98/61 (BP 1 Location: Left arm, BP Patient Position: At rest)   Pulse 60   Temp 99.7 °F (37.6 °C)   Resp 17   Ht 6' 1\" (1.854 m)   Wt 96.6 kg (213 lb)   SpO2 97%   BMI 28.10 kg/m²        Physical Exam:  Ears: hearing is intact  Eyes: Icterus is not present  Lungs: clear to auscultation bilaterally  Heart: regular rate and rhythm, S1, S2 normal, no murmur, click, rub or gallop  Gastrointestinal: soft, non-tender.  Bowel sounds normal. No masses,  no organomegaly  Neurological:  New Focal Deficits are not present  Ext: LLE wrapped in dressing  Psychiatric:  Mood is stable    Lab/Data Reviewed:  CMP:   Lab Results   Component Value Date/Time     07/30/2019 04:15 AM    K 3.8 07/30/2019 04:15 AM     07/30/2019 04:15 AM    CO2 28 07/30/2019 04:15 AM    AGAP 4 07/30/2019 04:15 AM     (H) 07/30/2019 04:15 AM    BUN 16 07/30/2019 04:15 AM    CREA 0.95 07/30/2019 04:15 AM    GFRAA >60 07/30/2019 04:15 AM    GFRNA >60 07/30/2019 04:15 AM    CA 8.0 (L) 07/30/2019 04:15 AM    MG 2.2 07/30/2019 04:15 AM    PHOS 3.4 07/30/2019 04:15 AM ALB 3.9 07/29/2019 11:55 AM    TP 7.1 07/29/2019 11:55 AM    GLOB 3.2 07/29/2019 11:55 AM    AGRAT 1.2 07/29/2019 11:55 AM    SGOT 23 07/29/2019 11:55 AM    ALT 21 07/29/2019 11:55 AM     CBC:   Lab Results   Component Value Date/Time    WBC 8.2 07/30/2019 04:15 AM    HGB 11.6 (L) 07/30/2019 04:15 AM    HCT 35.2 (L) 07/30/2019 04:15 AM     07/30/2019 04:15 AM       Imaging Reviewed:  Xr Tib/fib Lt    Result Date: 7/29/2019  Left tibia/fibula, 4 AP and lateral views: INDICATION: Pain with injury. There is a large irregular ulcerative skin lesion along the lateral distal calf soft tissues. Mild adjacent edema in the subcutaneous fat and loss of tissue planes is likely cellulitis. Underlying fibula is unremarkable. Ankle is normal except for very mild degenerative change. Vascular calcification of the foot vessels. Evidence of prior knee replacement. IMPRESSION: 1. Large ulcerative skin lesion. There are some changes of cellulitis adjacent to this. 2. No underlying bony abnormality. No plain film evidence of osteomyelitis. If that remains a strong concern, MRI or bone scan on a routine basis could be performed. 3. Knee replacement.

## 2019-07-30 NOTE — WOUND CARE
Wound/Ostomy Nurse Progress Note          Patient: Britany Fournier                                                                                      TZB:[birth year]    MRN: [id number]                    Situation: wound consult for left lower leg wound and cellulitis    Background: pt admitted for slow healing wound to left lower leg after injuring leg when falling off ladder on Fathers Day. Pt says wound has progressively gotten worse and is now deeper, red around the wound, and leg is swollen and warm. Assessment: wound to left lateral lower leg measures 9 x 5.2 x 1.6cm with half the wound covered with 50 % hard eschar and undermining 4.4 cm at 11 o'clock and 6.8 cm at 1 o'clock underneath the eschar. Wound bed has dark red and necrotic tissue with a moderate amount of serosanguinous drainage. Muscle exposed. Recommendation: Temporary moist to dry dressing placed on leg prior to MRI. NS moistened kelex applied to wound bed, covered with abdominal pad and wrapped w/ kerlex. Will change dressing to Aquacel AG and meplilex bordered foam when pt returns from MRI. Nurse Chastity Hubbard made aware and will call wound clinic when pt returns.

## 2019-07-30 NOTE — ROUTINE PROCESS
Bedside and Verbal shift change report given to [facility] (oncoming nurse) by Shelia Malave (offgoing nurse). Report included the following information SBAR, Kardex, Intake/Output and MAR.

## 2019-07-30 NOTE — PROGRESS NOTES
0730 Bedside and Verbal shift change report given to Genie To (oncoming nurse) by Allison Matthews (offgoing nurse). Report included the following information SBAR, Kardex, Intake/Output and MAR.

## 2019-07-30 NOTE — PROGRESS NOTES
Problem: Discharge Planning  Goal: *Discharge to safe environment  Outcome: Resolved/Met   Plan home    Reason for Admission:   cellulitis                   RRAT Score:     10                Plan for utilizing home health:      possibel                    Current Advanced Directive/Advance Care Plan: none                         Transition of Care Plan:   Spoke with pt, lives alone. Independent with adls, amb with a cane. Has cpap machine, no other dme. Designates significant other for dcp, transport home. Has mcr,BC FEP, TFL. pcp saw last thurs. Dr Ruddy Santana. Lives in 3 story home with elevator. Plan poss hh if needs iv antibxs, wd care at home. Cm to follow. Patient has designated ____significant other____________________ to participate in his/her discharge plan and to receive any needed information. Name: Charles Mitchell  Address:  Phone number: [phone] [facility] Management Interventions  PCP Verified by CM: Yes  Palliative Care Criteria Met (RRAT>21 & CHF Dx)?: No  Mode of Transport at Discharge:  Other (see comment)  Transition of Care Consult (CM Consult): Discharge Planning  Discharge Durable Medical Equipment: No  Physical Therapy Consult: No  Occupational Therapy Consult: No  Current Support Network: Lives Alone  Confirm Follow Up Transport: Other (see comment)  Plan discussed with Pt/Family/Caregiver: Yes  Discharge Location  Discharge Placement: Home, poss

## 2019-07-30 NOTE — PROGRESS NOTES
1900: Received pt from off going nurse [facility], pt resting in bed, side rails raised x3, bed in lowest position. 2000: Pt denies pain or nausea. Will continue to monitor. 0000: Pt denies pain. 0700: Bedside and Verbal shift change report given to Kleber Castillo (oncoming nurse) by Dominic Leonard   (offgoing nurse). Report included the following information SBAR, ED Summary, OR Summary, Intake/Output, MAR, Accordion, Recent Results, Med Rec Status and Cardiac Rhythm NSR.

## 2019-07-31 LAB
ANION GAP SERPL CALC-SCNC: 5 MMOL/L (ref 3–18)
BASOPHILS # BLD: 0 K/UL (ref 0–0.1)
BASOPHILS NFR BLD: 1 % (ref 0–2)
BUN SERPL-MCNC: 11 MG/DL (ref 7–18)
BUN/CREAT SERPL: 12 (ref 12–20)
CALCIUM SERPL-MCNC: 8.3 MG/DL (ref 8.5–10.1)
CHLORIDE SERPL-SCNC: 109 MMOL/L (ref 100–111)
CO2 SERPL-SCNC: 28 MMOL/L (ref 21–32)
CREAT SERPL-MCNC: 0.95 MG/DL (ref 0.6–1.3)
DATE LAST DOSE: NORMAL
DIFFERENTIAL METHOD BLD: ABNORMAL
EOSINOPHIL # BLD: 0.2 K/UL (ref 0–0.4)
EOSINOPHIL NFR BLD: 3 % (ref 0–5)
ERYTHROCYTE [DISTWIDTH] IN BLOOD BY AUTOMATED COUNT: 13.5 % (ref 11.6–14.5)
GLUCOSE SERPL-MCNC: 99 MG/DL (ref 74–99)
HCT VFR BLD AUTO: 36.4 % (ref 36–48)
HGB BLD-MCNC: 11.8 G/DL (ref 13–16)
LYMPHOCYTES # BLD: 1.5 K/UL (ref 0.9–3.6)
LYMPHOCYTES NFR BLD: 23 % (ref 21–52)
MAGNESIUM SERPL-MCNC: 2.4 MG/DL (ref 1.6–2.6)
MCH RBC QN AUTO: 31.5 PG (ref 24–34)
MCHC RBC AUTO-ENTMCNC: 32.4 G/DL (ref 31–37)
MCV RBC AUTO: 97.1 FL (ref 74–97)
MONOCYTES # BLD: 0.7 K/UL (ref 0.05–1.2)
MONOCYTES NFR BLD: 11 % (ref 3–10)
NEUTS SEG # BLD: 4.1 K/UL (ref 1.8–8)
NEUTS SEG NFR BLD: 62 % (ref 40–73)
PHOSPHATE SERPL-MCNC: 4.3 MG/DL (ref 2.5–4.9)
PLATELET # BLD AUTO: 169 K/UL (ref 135–420)
PMV BLD AUTO: 10.1 FL (ref 9.2–11.8)
POTASSIUM SERPL-SCNC: 3.8 MMOL/L (ref 3.5–5.5)
RBC # BLD AUTO: 3.75 M/UL (ref 4.7–5.5)
REPORTED DOSE,DOSE: NORMAL UNITS
REPORTED DOSE/TIME,TMG: 100
SODIUM SERPL-SCNC: 142 MMOL/L (ref 136–145)
VANCOMYCIN TROUGH SERPL-MCNC: 10.6 UG/ML (ref 10–20)
WBC # BLD AUTO: 6.4 K/UL (ref 4.6–13.2)

## 2019-07-31 PROCEDURE — 74011000250 HC RX REV CODE- 250: Performed by: PLASTIC SURGERY

## 2019-07-31 PROCEDURE — 74011250636 HC RX REV CODE- 250/636: Performed by: HOSPITALIST

## 2019-07-31 PROCEDURE — 85025 COMPLETE CBC W/AUTO DIFF WBC: CPT

## 2019-07-31 PROCEDURE — 65270000029 HC RM PRIVATE

## 2019-07-31 PROCEDURE — 36415 COLL VENOUS BLD VENIPUNCTURE: CPT

## 2019-07-31 PROCEDURE — 87070 CULTURE OTHR SPECIMN AEROBIC: CPT

## 2019-07-31 PROCEDURE — 80048 BASIC METABOLIC PNL TOTAL CA: CPT

## 2019-07-31 PROCEDURE — 74011250637 HC RX REV CODE- 250/637: Performed by: HOSPITALIST

## 2019-07-31 PROCEDURE — 74011000258 HC RX REV CODE- 258: Performed by: INTERNAL MEDICINE

## 2019-07-31 PROCEDURE — 74011250636 HC RX REV CODE- 250/636: Performed by: INTERNAL MEDICINE

## 2019-07-31 PROCEDURE — 87186 SC STD MICRODIL/AGAR DIL: CPT

## 2019-07-31 PROCEDURE — 83735 ASSAY OF MAGNESIUM: CPT

## 2019-07-31 PROCEDURE — 80202 ASSAY OF VANCOMYCIN: CPT

## 2019-07-31 PROCEDURE — 87077 CULTURE AEROBIC IDENTIFY: CPT

## 2019-07-31 PROCEDURE — 84100 ASSAY OF PHOSPHORUS: CPT

## 2019-07-31 RX ORDER — VANCOMYCIN/0.9 % SOD CHLORIDE 1.5G/250ML
1500 PLASTIC BAG, INJECTION (ML) INTRAVENOUS EVERY 12 HOURS
Status: DISCONTINUED | OUTPATIENT
Start: 2019-07-31 | End: 2019-08-02

## 2019-07-31 RX ADMIN — ACYCLOVIR 400 MG: 800 TABLET ORAL at 17:12

## 2019-07-31 RX ADMIN — HEPARIN SODIUM 5000 UNITS: 5000 INJECTION INTRAVENOUS; SUBCUTANEOUS at 05:52

## 2019-07-31 RX ADMIN — HEPARIN SODIUM 5000 UNITS: 5000 INJECTION INTRAVENOUS; SUBCUTANEOUS at 13:08

## 2019-07-31 RX ADMIN — PRAVASTATIN SODIUM 40 MG: 20 TABLET ORAL at 21:25

## 2019-07-31 RX ADMIN — DAKIN'S SOLUTION 0.125% (QUARTER STRENGTH): 0.12 SOLUTION at 21:39

## 2019-07-31 RX ADMIN — VANCOMYCIN HYDROCHLORIDE 1500 MG: 10 INJECTION, POWDER, LYOPHILIZED, FOR SOLUTION INTRAVENOUS at 21:39

## 2019-07-31 RX ADMIN — PIPERACILLIN SODIUM,TAZOBACTAM SODIUM 3.38 G: 3; .375 INJECTION, POWDER, FOR SOLUTION INTRAVENOUS at 08:09

## 2019-07-31 RX ADMIN — PIPERACILLIN SODIUM,TAZOBACTAM SODIUM 3.38 G: 3; .375 INJECTION, POWDER, FOR SOLUTION INTRAVENOUS at 17:13

## 2019-07-31 RX ADMIN — ACETAMINOPHEN 650 MG: 325 TABLET, FILM COATED ORAL at 21:25

## 2019-07-31 RX ADMIN — PIPERACILLIN SODIUM,TAZOBACTAM SODIUM 3.38 G: 3; .375 INJECTION, POWDER, FOR SOLUTION INTRAVENOUS at 00:48

## 2019-07-31 RX ADMIN — ACYCLOVIR 400 MG: 800 TABLET ORAL at 08:09

## 2019-07-31 RX ADMIN — HEPARIN SODIUM 5000 UNITS: 5000 INJECTION INTRAVENOUS; SUBCUTANEOUS at 21:25

## 2019-07-31 RX ADMIN — SODIUM CHLORIDE 1250 MG: 900 INJECTION, SOLUTION INTRAVENOUS at 13:08

## 2019-07-31 RX ADMIN — ACETAMINOPHEN 650 MG: 325 TABLET, FILM COATED ORAL at 08:17

## 2019-07-31 RX ADMIN — SODIUM CHLORIDE 1250 MG: 900 INJECTION, SOLUTION INTRAVENOUS at 00:48

## 2019-07-31 NOTE — ROUTINE PROCESS
Board updated. Assessment completed. No distress voiced/noted. Pain documented. Tylenol given per pt request. Call bell within reach. Will continue to monitor.

## 2019-07-31 NOTE — PROGRESS NOTES
Nursing assessment complete. Patient resting in bed  Patient had good night. Bedside shift change report given to Gualberto Mcfarlane (oncoming nurse) by Mingo Estrada RN'   (offgoing nurse). Report included the following information SBAR, MAR and Recent Results.

## 2019-07-31 NOTE — ROUTINE PROCESS
Bedside and Verbal shift change report given to Aurelia (oncoming nurse) by Rom Robbins (offgoing nurse). Report included the following information SBAR, Kardex, Intake/Output and MAR.

## 2019-07-31 NOTE — PROGRESS NOTES
Bedside and Verbal shift change report given to Jeanie (oncoming nurse) by Johan Kingston (offgoing nurse). Report included the following information SBAR, Kardex, Intake/Output and MAR. Wound culture discussed during shift change. Pt wants to wait until morning to get culture.

## 2019-07-31 NOTE — PROGRESS NOTES
Progress Note      Patient: Samy Lopez               Sex: male          DOA: 7/29/2019       YOB: 1950      Age:  71 y.o.        LOS:  LOS: 2 days             CHIEF COMPLAINT:  Skin Problem      Assessment/Plan:     Principal Problem:    Cellulitis (7/29/2019)    Active Problems:    A-fib ([address] (7/29/2019)      KARLO (obstructive sleep apnea) (7/29/2019)           PLAN:  Cont broad spectrum IV atbx, possible transition to PO today per ID  MRI Tib/Fib to r/o osteo, shows cellulitis, mild fasciitis and minimal myositis, no evidence of osteo  Wound care consult, appreciate assistance  PRS consult given deep wound, assess need for surgical debridement, awaiting consult  F/u blood cultures, no growth day 2  Hold pradaxa for now in case pt needs surgery, discussed with pt reasons for holding, and he agrees  Nocturnal CPAP     DVT ppx: sq heparin while pradaxa being held, SCD      Subjective:     Pt said swelling and redness has gone down, he feels he is improving    Objective:     Visit Vitals  BP (!) 137/98 (BP 1 Location: Left arm, BP Patient Position: At rest)   Pulse 96   Temp 98.4 °F (36.9 °C)   Resp 18   Ht 6' 1\" (1.854 m)   Wt 96.6 kg (213 lb)   SpO2 99%   BMI 28.10 kg/m²        Physical Exam:  Ears: hearing is intact  Eyes: Icterus is not present  Lungs: clear to auscultation bilaterally  Heart: regular rate and rhythm, S1, S2 normal, no murmur, click, rub or gallop  Gastrointestinal: soft, non-tender.  Bowel sounds normal. No masses,  no organomegaly  Neurological:  New Focal Deficits are not present  Ext: LLE wrapped in dressing, erythema surrounding is improving  Psychiatric:  Mood is stable    Lab/Data Reviewed:  CMP:   Lab Results   Component Value Date/Time     07/31/2019 01:45 AM    K 3.8 07/31/2019 01:45 AM     07/31/2019 01:45 AM    CO2 28 07/31/2019 01:45 AM    AGAP 5 07/31/2019 01:45 AM    GLU 99 07/31/2019 01:45 AM    BUN 11 07/31/2019 01:45 AM    CREA 0.95 07/31/2019 01:45 AM    GFRAA >60 07/31/2019 01:45 AM    GFRNA >60 07/31/2019 01:45 AM    CA 8.3 (L) 07/31/2019 01:45 AM    MG 2.4 07/31/2019 01:45 AM    PHOS 4.3 07/31/2019 01:45 AM     CBC:   Lab Results   Component Value Date/Time    WBC 6.4 07/31/2019 01:45 AM    HGB 11.8 (L) 07/31/2019 01:45 AM    HCT 36.4 07/31/2019 01:45 AM     07/31/2019 01:45 AM       Imaging Reviewed:  Gibran Shukla Tib/fib Lt W Wo Cont    Result Date: 7/30/2019  MRI EXTREMITY ( INFECTION), lower leg, left History: Swelling signs of inflammation, with ulcer, possible infection and suspected abscess and/or osteomyelitis, for further evaluation MR technique: Sagittal and/or coronal T1 weighted spin echo, STIR fast spin echo, transverse/ axial T1 weighted spin echo, T2 weighted, STIR fast spin echo sequences precontrast . These are complemented with sagittal and/or coronal T1 weighted and transverse/ axial  post contrast fat suppression T1 weighted spin echo sequences as needed for best delineation. No prior MR. Comparison plain films 7/29/2017 MR FINDINGS: There is a deep moderate-sized in the subcutaneous mid anterolateral left lower leg roughly 6 x 4 cm and 1 cm deep. There is a regional area of mixed deep and surrounding rind of nonspecific slightly increased T1 signal minimally complex nonenhancing probably serosanguineous fluid and necrotic granulation tissue encompassing a roughly 14 x 7 cm region over the anterior and lateral subcutaneous fat extending to the fascia. There is associated superficial fascial edema and enhancement and minimal local deep fascial edema and enhancement between and extending minimally into the anterior compartment extensor digitorum and to a lesser extent anterior tibial, and the peroneus brevis muscles. Intact associated tendons.  A mild amount of more generalized both enhancing and nonenhancing edema extends over the lower third of the leg The also approaches within 1.2 cm of the fibula however the fibula and the more distant tibia remain of normal marrow signal. MISC: Susceptibility artifact in the proximal tibia consistent with (and bilateral) tibial component of total knee arthroplasties. Grossly unremarkable ankle joint     IMPRESSION: 1. Large and deep lateral middistal junction lower leg ulcer with underlying hemorrhage/edema and rind of subcutaneous devitalized granulation tissue. 2. Superficial local cellulitis, and mild local anterior compartment deep fasciitis and minimal myositis. No evidence of tibiofibular osteomyelitis.

## 2019-07-31 NOTE — CONSULTS
Thank you - this perfectly healthy 71year old who has had atrial fibrillation for 20 years is anticoagulated - on June 16 he has a relatively minor fall from a ladder causing soft tissue injury to his left lower leg - area not \"drained\" but probably represented a haematoma - this spontaneously drained a week ago and became infected - the 8 x 4 cm necrotic area is partially desloughed with necrotic skin proximally - the deep tibialis anterior muscle and tendon do not seem to be involved - with dressing changes and some bedside debridement granulations will appear - then grafting will be possible [ approx 3 weeks ] - all this explained to the patient - will follow. Keya Onofre

## 2019-07-31 NOTE — PROGRESS NOTES
Problem: Discharge Planning  Goal: *Discharge to safe environment  Outcome: Resolved/Met   Plan home    Looks like will go on po antibxs, cm to follow for hh needs.

## 2019-07-31 NOTE — PROGRESS NOTES
Infectious Disease Follow-up Note      Date of Admission: 7/29/2019     Date of Note:  7/31/2019      Summary:     71year-old CM w/ CAF, KARLO adm 7/29 w/ infected L leg wound / cellulitis. Scraped when fell off ladder 5-6  weeks PTA. Later red, hot, painful- better w/ clindamycin x 2 weeks but worse after stopped 4 days PTA - foul smelling discharge when scab came off and red again    Interval History:     Feels better. Leg is less tight than its been in the past six weeks.  Afebrile    Current Antimicrobials: Prior Antimicrobials   Vancomycin, Zosyn 7/29 - 2        Assessment / Plan:      Infected left leg wound, cellulitis  - has large cutaneous defect with surrounding cellulitis  - failed OP clindamycin x 2 weeks  - wd cx 7/30 IP  - erythema slowly improving -> continue current Vanc, Zosyn today, probably de-escalate to po Augmentin, Levofloxacin  -> wound culture  -> continue to elevate left leg above heart level  -> monitor blcx's 7/29  -> await PRS evaluation   Chronic Atrial Fibrillation     KARLO        Microbiology:      7/29      blcx NGTD x 2     Lines / Catheters:      piv      Patient Active Problem List   Diagnosis Code    Cellulitis L03.90    A-fib (HCC) I48.91    KARLO (obstructive sleep apnea) G47.33           Current Facility-Administered Medications   Medication Dose Route Frequency    vancomycin (VANCOCIN) 1500 mg in  ml infusion  1,500 mg IntraVENous Q12H    [START ON 8/2/2019] VANCOMYCIN INFORMATION NOTE   Other ONCE    VANCOMYCIN INFORMATION NOTE   Other Rx Dosing/Monitoring    pravastatin (PRAVACHOL) tablet 40 mg  40 mg Oral QHS    acetaminophen (TYLENOL) tablet 650 mg  650 mg Oral Q4H PRN    acyclovir (ZOVIRAX) tablet 400 mg  400 mg Oral BID    heparin (porcine) injection 5,000 Units  5,000 Units SubCUTAneous Q8H    piperacillin-tazobactam (ZOSYN) 3.375 g in 0.9% sodium chloride (MBP/ADV) 100 mL MBP \"EXTENDED 4 HOUR INFUSION###\"   3.375 g IntraVENous Q8H         Review of Systems - Negative except as in interval history       Objective:     Visit Vitals  /70 (BP 1 Location: Left arm, BP Patient Position: At rest)   Pulse 61   Temp 98.8 °F (37.1 °C)   Resp 18   Ht 6' 1\" (1.854 m)   Wt 96.6 kg (213 lb)   SpO2 97%   BMI 28.10 kg/m²       Temp (24hrs), Av.7 °F (37.1 °C), Min:98.1 °F (36.7 °C), Max:100 °F (37.8 °C)      General: Well developed, well nourished 71 y.o.  male in no acute distress. ENT: ENT exam normal, no neck nodes or sinus tenderness  Head: normocephalic, without obvious abnormality  Mouth:  mucous membranes moist, pharynx normal without lesions  Neck: supple, symmetrical, trachea midline   Cardio:  irregularly irregular rhythm and S1, S2 normal  Chest: inspection normal - no chest wall deformities or tenderness, respiratory effort normal  Lungs: no wheezes or rales  Abdomen: soft, non-tender. Bowel sounds normal. No masses, no organomegaly. Extremities:  Left leg dressing over wound - surrounding erythema less than yesterday.  Edema also decreased    Lab results     Chemistry  Recent Labs     19  0145 19  0415 19  1155   GLU 99 110* 100*    140 140   K 3.8 3.8 4.0    108 108   CO2 28 28 29   BUN 11 16 20*   CREA 0.95 0.95 0.89   CA 8.3* 8.0* 8.9   AGAP 5 4 3   BUCR 12 17 22*   AP  --   --  65   TP  --   --  7.1   ALB  --   --  3.9   GLOB  --   --  3.2   AGRAT  --   --  1.2       CBC w/ Diff  Recent Labs     19  0145 19  0415 19  1155   WBC 6.4 8.2 8.5   RBC 3.75* 3.65* 4.01*   HGB 11.8* 11.6* 12.7*   HCT 36.4 35.2* 38.5    166 169   GRANS 62 73 72   LYMPH 23 15* 16*   EOS 3 2 1       Microbiology  All Micro Results     Procedure Component Value Units Date/Time    CULTURE, Svetlana Moots STAIN [[id number]] Collected:  19 0955    Order Status:  Completed Specimen:  Wound from Leg Updated:  19 1522     Special Requests: NO SPECIAL REQUESTS        GRAM STAIN MODERATE WBC'S         NO ORGANISMS SEEN        Culture result: PENDING    CULTURE, BLOOD [[id number]] Collected:  07/29/19 1220    Order Status:  Completed Specimen:  Blood Updated:  07/31/19 0748     Special Requests: NO SPECIAL REQUESTS        Culture result: NO GROWTH 2 DAYS       CULTURE, BLOOD [[id number]] Collected:  07/29/19 1210    Order Status:  Completed Specimen:  Blood Updated:  07/31/19 0748     Special Requests: NO SPECIAL REQUESTS        Culture result: NO GROWTH 2 DAYS                Michaela Jimenez MD, [facility]  Infectious Disease Specialist  Pager [phone]

## 2019-08-01 LAB
ANION GAP SERPL CALC-SCNC: 8 MMOL/L (ref 3–18)
BASOPHILS # BLD: 0 K/UL (ref 0–0.1)
BASOPHILS NFR BLD: 1 % (ref 0–2)
BUN SERPL-MCNC: 9 MG/DL (ref 7–18)
BUN/CREAT SERPL: 10 (ref 12–20)
CALCIUM SERPL-MCNC: 8.2 MG/DL (ref 8.5–10.1)
CHLORIDE SERPL-SCNC: 109 MMOL/L (ref 100–111)
CO2 SERPL-SCNC: 25 MMOL/L (ref 21–32)
CREAT SERPL-MCNC: 0.86 MG/DL (ref 0.6–1.3)
DIFFERENTIAL METHOD BLD: ABNORMAL
EOSINOPHIL # BLD: 0.2 K/UL (ref 0–0.4)
EOSINOPHIL NFR BLD: 3 % (ref 0–5)
ERYTHROCYTE [DISTWIDTH] IN BLOOD BY AUTOMATED COUNT: 13.3 % (ref 11.6–14.5)
GLUCOSE SERPL-MCNC: 121 MG/DL (ref 74–99)
HCT VFR BLD AUTO: 36.3 % (ref 36–48)
HGB BLD-MCNC: 11.8 G/DL (ref 13–16)
LYMPHOCYTES # BLD: 1.4 K/UL (ref 0.9–3.6)
LYMPHOCYTES NFR BLD: 21 % (ref 21–52)
MAGNESIUM SERPL-MCNC: 2.4 MG/DL (ref 1.6–2.6)
MCH RBC QN AUTO: 31.4 PG (ref 24–34)
MCHC RBC AUTO-ENTMCNC: 32.5 G/DL (ref 31–37)
MCV RBC AUTO: 96.5 FL (ref 74–97)
MONOCYTES # BLD: 0.6 K/UL (ref 0.05–1.2)
MONOCYTES NFR BLD: 10 % (ref 3–10)
NEUTS SEG # BLD: 4.4 K/UL (ref 1.8–8)
NEUTS SEG NFR BLD: 65 % (ref 40–73)
PHOSPHATE SERPL-MCNC: 3.8 MG/DL (ref 2.5–4.9)
PLATELET # BLD AUTO: 176 K/UL (ref 135–420)
PMV BLD AUTO: 9.9 FL (ref 9.2–11.8)
POTASSIUM SERPL-SCNC: 3.6 MMOL/L (ref 3.5–5.5)
RBC # BLD AUTO: 3.76 M/UL (ref 4.7–5.5)
SODIUM SERPL-SCNC: 142 MMOL/L (ref 136–145)
WBC # BLD AUTO: 6.6 K/UL (ref 4.6–13.2)

## 2019-08-01 PROCEDURE — 36415 COLL VENOUS BLD VENIPUNCTURE: CPT

## 2019-08-01 PROCEDURE — 65270000029 HC RM PRIVATE

## 2019-08-01 PROCEDURE — 85025 COMPLETE CBC W/AUTO DIFF WBC: CPT

## 2019-08-01 PROCEDURE — 77030018836 HC SOL IRR NACL ICUM -A

## 2019-08-01 PROCEDURE — 80048 BASIC METABOLIC PNL TOTAL CA: CPT

## 2019-08-01 PROCEDURE — 74011250636 HC RX REV CODE- 250/636: Performed by: HOSPITALIST

## 2019-08-01 PROCEDURE — 83735 ASSAY OF MAGNESIUM: CPT

## 2019-08-01 PROCEDURE — 74011000258 HC RX REV CODE- 258: Performed by: INTERNAL MEDICINE

## 2019-08-01 PROCEDURE — 74011250637 HC RX REV CODE- 250/637: Performed by: HOSPITALIST

## 2019-08-01 PROCEDURE — 0JBP0ZZ EXCISION OF LEFT LOWER LEG SUBCUTANEOUS TISSUE AND FASCIA, OPEN APPROACH: ICD-10-PCS | Performed by: PLASTIC SURGERY

## 2019-08-01 PROCEDURE — 84100 ASSAY OF PHOSPHORUS: CPT

## 2019-08-01 PROCEDURE — 74011250636 HC RX REV CODE- 250/636: Performed by: INTERNAL MEDICINE

## 2019-08-01 RX ORDER — POTASSIUM CHLORIDE 20 MEQ/1
40 TABLET, EXTENDED RELEASE ORAL ONCE
Status: COMPLETED | OUTPATIENT
Start: 2019-08-01 | End: 2019-08-01

## 2019-08-01 RX ADMIN — DAKIN'S SOLUTION 0.125% (QUARTER STRENGTH): 0.12 SOLUTION at 09:53

## 2019-08-01 RX ADMIN — PRAVASTATIN SODIUM 40 MG: 20 TABLET ORAL at 21:54

## 2019-08-01 RX ADMIN — HEPARIN SODIUM 5000 UNITS: 5000 INJECTION INTRAVENOUS; SUBCUTANEOUS at 21:55

## 2019-08-01 RX ADMIN — PIPERACILLIN SODIUM,TAZOBACTAM SODIUM 3.38 G: 3; .375 INJECTION, POWDER, FOR SOLUTION INTRAVENOUS at 11:00

## 2019-08-01 RX ADMIN — HEPARIN SODIUM 5000 UNITS: 5000 INJECTION INTRAVENOUS; SUBCUTANEOUS at 14:37

## 2019-08-01 RX ADMIN — PIPERACILLIN SODIUM,TAZOBACTAM SODIUM 3.38 G: 3; .375 INJECTION, POWDER, FOR SOLUTION INTRAVENOUS at 00:17

## 2019-08-01 RX ADMIN — VANCOMYCIN HYDROCHLORIDE 1500 MG: 10 INJECTION, POWDER, LYOPHILIZED, FOR SOLUTION INTRAVENOUS at 21:49

## 2019-08-01 RX ADMIN — HEPARIN SODIUM 5000 UNITS: 5000 INJECTION INTRAVENOUS; SUBCUTANEOUS at 05:55

## 2019-08-01 RX ADMIN — VANCOMYCIN HYDROCHLORIDE 1500 MG: 10 INJECTION, POWDER, LYOPHILIZED, FOR SOLUTION INTRAVENOUS at 08:33

## 2019-08-01 RX ADMIN — PIPERACILLIN SODIUM,TAZOBACTAM SODIUM 3.38 G: 3; .375 INJECTION, POWDER, FOR SOLUTION INTRAVENOUS at 19:14

## 2019-08-01 RX ADMIN — POTASSIUM CHLORIDE 40 MEQ: 20 TABLET, EXTENDED RELEASE ORAL at 08:23

## 2019-08-01 RX ADMIN — ACYCLOVIR 400 MG: 800 TABLET ORAL at 19:12

## 2019-08-01 RX ADMIN — ACETAMINOPHEN 650 MG: 325 TABLET, FILM COATED ORAL at 20:07

## 2019-08-01 RX ADMIN — ACYCLOVIR 400 MG: 800 TABLET ORAL at 08:23

## 2019-08-01 NOTE — PROGRESS NOTES
Progress Note      Patient: Anant Bettencourt               Sex: male          DOA: 7/29/2019       YOB: 1950      Age:  71 y.o.        LOS:  LOS: 3 days             CHIEF COMPLAINT:  Skin Problem      Assessment/Plan:     Principal Problem:    Cellulitis (7/29/2019)    Active Problems:    A-fib ([address] (7/29/2019)      KARLO (obstructive sleep apnea) (7/29/2019)           PLAN:  · Cont broad spectrum IV atbx per ID  · MRI Tib/Fib to r/o osteo, shows cellulitis, mild fasciitis and minimal myositis, no evidence of osteo  · Wound care consult, appreciate assistance  · PRS consult given deep wound, assess need for surgical debridement, appreciate Dr. Gareth Reid assistance, pt will get bedside debridement and then skin graft in 3 weeks   · Hold pradaxa for now in case pt needs surgery, discussed with pt reasons for holding, and he agrees, he will resume on discharge and advised to f/u with his Cardiologist on when to hold it prior to his skin graft surgery. · F/u blood cultures, no growth day 3  · Nocturnal CPAP    DVT ppx: sq heparin while pradaxa being held, SCD      Subjective:     Pt said his leg swelling is 100% better. He is happy with care. Objective:     Visit Vitals  /68 (BP 1 Location: Left arm, BP Patient Position: At rest)   Pulse 74   Temp 97.8 °F (36.6 °C)   Resp 16   Ht 6' 1\" (1.854 m)   Wt 96.6 kg (213 lb)   SpO2 98%   BMI 28.10 kg/m²        Physical Exam:  Ears: hearing is intact  Eyes: Icterus is not present  Lungs: clear to auscultation bilaterally  Heart: regular rate and rhythm, S1, S2 normal, no murmur, click, rub or gallop  Gastrointestinal: soft, non-tender.  Bowel sounds normal. No masses,  no organomegaly  Neurological:  New Focal Deficits are not present  Ext: LLE wrapped in dressing, erythema surrounding is improving  Psychiatric:  Mood is stable    Lab/Data Reviewed:  CMP:   Lab Results   Component Value Date/Time     08/01/2019 01:30 AM    K 3.6 08/01/2019 01:30 AM  08/01/2019 01:30 AM    CO2 25 08/01/2019 01:30 AM    AGAP 8 08/01/2019 01:30 AM     (H) 08/01/2019 01:30 AM    BUN 9 08/01/2019 01:30 AM    CREA 0.86 08/01/2019 01:30 AM    GFRAA >60 08/01/2019 01:30 AM    GFRNA >60 08/01/2019 01:30 AM    CA 8.2 (L) 08/01/2019 01:30 AM    MG 2.4 08/01/2019 01:30 AM    PHOS 3.8 08/01/2019 01:30 AM     CBC:   Lab Results   Component Value Date/Time    WBC 6.6 08/01/2019 01:30 AM    HGB 11.8 (L) 08/01/2019 01:30 AM    HCT 36.3 08/01/2019 01:30 AM     08/01/2019 01:30 AM       Imaging Reviewed:  No results found.

## 2019-08-01 NOTE — PROGRESS NOTES
Problem: Pain  Goal: *Control of Pain  Outcome: Progressing Towards Goal  Goal: *PALLIATIVE CARE:  Alleviation of Pain  Outcome: Progressing Towards Goal     Problem: Pressure Injury - Risk of  Goal: *Prevention of pressure injury  Description  Document Sonu Scale and appropriate interventions in the flowsheet. Outcome: Progressing Towards Goal  Note:   Pressure Injury Interventions:  Sensory Interventions: Use 30-degree side-lying position, Pressure redistribution bed/mattress (bed type), Monitor skin under medical devices, Maintain/enhance activity level, Keep linens dry and wrinkle-free, Check visual cues for pain, Assess need for specialty bed    Moisture Interventions: Offer toileting Q_hr, Maintain skin hydration (lotion/cream), Internal/External urinary devices, Check for incontinence Q2 hours and as needed, Apply protective barrier, creams and emollients, Absorbent underpads    Activity Interventions: Pressure redistribution bed/mattress(bed type), Increase time out of bed    Mobility Interventions: Pressure redistribution bed/mattress (bed type), HOB 30 degrees or less, Turn and reposition approx. every two hours(pillow and wedges)    Nutrition Interventions: Document food/fluid/supplement intake                     Problem: Lower Extremity Wound Care  Goal: *Non-infected wound: Improvement of existing wound, absence of infection, and maintenance of skin integrity  Outcome: Progressing Towards Goal  Goal: *Infected Wound: Prevention of further infection and promotion of healing  Description  Infection control procedures (eg: clean dressings, clean gloves, hand washing, precautions to isolate wound from contamination, sterile instruments used for wound debridement) should be implemented.   Outcome: Progressing Towards Goal  Goal: Interventions  Outcome: Progressing Towards Goal     Problem: Falls - Risk of  Goal: *Absence of Falls  Description  Document Travis Boeck Fall Risk and appropriate interventions in the flowsheet. Outcome: Progressing Towards Goal  Note:   Fall Risk Interventions:  Mobility Interventions: Communicate number of staff needed for ambulation/transfer         Medication Interventions: Evaluate medications/consider consulting pharmacy    Elimination Interventions: Call light in reach, Toileting schedule/hourly rounds, Patient to call for help with toileting needs    History of Falls Interventions: Assess for delayed presentation/identification of injury for 48 hrs (comment for end date), Room close to nurse's station, Door open when patient unattended         Problem: Lower Extremity Wound Care  Goal: *Non-infected wound: Improvement of existing wound, absence of infection, and maintenance of skin integrity  Outcome: Progressing Towards Goal  Goal: *Infected Wound: Prevention of further infection and promotion of healing  Description  Infection control procedures (eg: clean dressings, clean gloves, hand washing, precautions to isolate wound from contamination, sterile instruments used for wound debridement) should be implemented.   Outcome: Progressing Towards Goal  Goal: Interventions  Outcome: Progressing Towards Goal

## 2019-08-01 NOTE — PROGRESS NOTES
1930: Assumed care; Pt resting in bed; no distress noted; Pt denies pain; bed in low position; Side rails up x 3; Call light and personal belonging within reach. Will continue to monitor. 2125: Pt pain pre-treated for wound care. 2141: Wound care completed as ordered. 0017: Pt resting. No distress noted. Will continue to monitor.

## 2019-08-01 NOTE — PROGRESS NOTES
Infectious Disease Follow-up Note      Date of Admission: 7/29/2019     Date of Note:  8/1/2019      Summary:     71year-old CM w/ CAF, KARLO adm 7/29 w/ infected L leg wound / cellulitis. Scraped when fell off ladder 5-6  weeks PTA. Later red, hot, painful- better w/ clindamycin x 2 weeks but worse after stopped 4 days PTA - foul smelling discharge when scab came off and red again    Interval History:     Feels better. Leg is less tight than its been in the past six weeks.  Afebrile    Current Antimicrobials: Prior Antimicrobials   Vancomycin, Zosyn 7/29 - 3        Assessment / Plan:      Infected left leg wound, cellulitis  - has large cutaneous defect with surrounding cellulitis  - failed OP clindamycin x 2 weeks  - wd cx 7/30 IP  - erythema slowly improving  - appreciate Dr. Patricia Aaron' input -> continue Vanc, Zosyn today  -> probably start po Augmentin 875/125 bid, Levofloxacin 750 mg daily x 5 days starting tomorrow     Chronic Atrial Fibrillation     KARLO        Microbiology:      7/29      blcx NGTD x 2     Lines / Catheters:      piv      Patient Active Problem List   Diagnosis Code    Cellulitis L03.90    A-fib (HCC) I48.91    KARLO (obstructive sleep apnea) G47.33       Current Facility-Administered Medications   Medication Dose Route Frequency    vancomycin (VANCOCIN) 1500 mg in  ml infusion  1,500 mg IntraVENous Q12H    [START ON 8/2/2019] VANCOMYCIN INFORMATION NOTE   Other ONCE    sodium hypochlorite (QUARTER STRENGTH DAKIN'S) 0.125% irrigation (bottle)   Topical BID    VANCOMYCIN INFORMATION NOTE   Other Rx Dosing/Monitoring    pravastatin (PRAVACHOL) tablet 40 mg  40 mg Oral QHS    acetaminophen (TYLENOL) tablet 650 mg  650 mg Oral Q4H PRN    acyclovir (ZOVIRAX) tablet 400 mg  400 mg Oral BID    heparin (porcine) injection 5,000 Units  5,000 Units SubCUTAneous Q8H    piperacillin-tazobactam (ZOSYN) 3.375 g in 0.9% sodium chloride (MBP/ADV) 100 mL MBP \"EXTENDED 4 HOUR INFUSION###\"   3.375 g IntraVENous Q8H         Review of Systems - Negative except as in interval history       Objective:     Visit Vitals  /68 (BP 1 Location: Left arm, BP Patient Position: At rest)   Pulse 74   Temp 97.8 °F (36.6 °C)   Resp 16   Ht 6' 1\" (1.854 m)   Wt 96.6 kg (213 lb)   SpO2 98%   BMI 28.10 kg/m²       Temp (24hrs), Av.1 °F (36.7 °C), Min:97.6 °F (36.4 °C), Max:98.7 °F (37.1 °C)      General: Well developed, well nourished 71 y.o.  male in no acute distress. ENT: ENT exam normal, no neck nodes or sinus tenderness  Head: normocephalic, without obvious abnormality  Mouth:  mucous membranes moist, pharynx normal without lesions  Neck: supple, symmetrical, trachea midline   Cardio:  irregularly irregular rhythm and S1, S2 normal  Chest: inspection normal - no chest wall deformities or tenderness, respiratory effort normal  Lungs: no wheezes or rales  Abdomen: soft, non-tender. Bowel sounds normal. No masses, no organomegaly. Extremities:  Left leg dressing over wound - surrounding erythema less than yesterday.  Edema also decreased    Lab results     Chemistry  Recent Labs     19  0415   * 99 110*    142 140   K 3.6 3.8 3.8    109 108   CO2 25 28 28   BUN 9 11 16   CREA 0.86 0.95 0.95   CA 8.2* 8.3* 8.0*   AGAP 8 5 4   BUCR 10* 12 17       CBC w/ Diff  Recent Labs     195 19  0415   WBC 6.6 6.4 8.2   RBC 3.76* 3.75* 3.65*   HGB 11.8* 11.8* 11.6*   HCT 36.3 36.4 35.2*    169 166   GRANS 65 62 73   LYMPH 21 23 15*   EOS 3 3 2       Microbiology  All Micro Results     Procedure Component Value Units Date/Time    CULTURE, Brent Blanchard STAIN [[id number]]  (Abnormal) Collected:  19 0927    Order Status:  Completed Specimen:  Wound from Leg Updated:  19 1103     Special Requests: NO SPECIAL REQUESTS        GRAM STAIN MODERATE WBC'S         NO ORGANISMS SEEN        Culture result: FEW GRAM NEGATIVE RODS CULTURE, BLOOD [[id number]] Collected:  07/29/19 1210    Order Status:  Completed Specimen:  Blood Updated:  08/01/19 0729     Special Requests: NO SPECIAL REQUESTS        Culture result: NO GROWTH 3 DAYS       CULTURE, BLOOD [[id number]] Collected:  07/29/19 1220    Order Status:  Completed Specimen:  Blood Updated:  08/01/19 0729     Special Requests: NO SPECIAL REQUESTS        Culture result: NO GROWTH 3 DAYS                Riccardo Maynard MD, [facility]  Infectious Disease Specialist  Pager [phone]

## 2019-08-01 NOTE — PROGRESS NOTES
0730  Refused seqential stockings  since yesterday, refused IS, he said his lungs  Are good. 0830  Vanco and Zosyn at same time, pt refused another IV  Access, per  [employer] , pharmacy hang Vanco and Zosyn to be adjusted. 1100  No  Pain, dressing of the left leg  Done, reddish around the wound    1500  Up  To the bathroom, no pain. 1900  No  Pain so far. left leg elevated on 3 pillows.

## 2019-08-02 ENCOUNTER — HOME HEALTH ADMISSION (OUTPATIENT)
Dept: HOME HEALTH SERVICES | Facility: HOME HEALTH | Age: 69
End: 2019-08-02
Payer: MEDICARE

## 2019-08-02 VITALS
BODY MASS INDEX: 28.23 KG/M2 | WEIGHT: 213 LBS | SYSTOLIC BLOOD PRESSURE: 143 MMHG | TEMPERATURE: 97.7 F | DIASTOLIC BLOOD PRESSURE: 95 MMHG | HEART RATE: 57 BPM | OXYGEN SATURATION: 99 % | RESPIRATION RATE: 18 BRPM | HEIGHT: 73 IN

## 2019-08-02 LAB — VANCOMYCIN TROUGH SERPL-MCNC: 12.7 UG/ML (ref 10–20)

## 2019-08-02 PROCEDURE — 74011250636 HC RX REV CODE- 250/636: Performed by: HOSPITALIST

## 2019-08-02 PROCEDURE — 74011000258 HC RX REV CODE- 258: Performed by: INTERNAL MEDICINE

## 2019-08-02 PROCEDURE — 74011250637 HC RX REV CODE- 250/637: Performed by: HOSPITALIST

## 2019-08-02 PROCEDURE — 74011250636 HC RX REV CODE- 250/636: Performed by: INTERNAL MEDICINE

## 2019-08-02 PROCEDURE — 36415 COLL VENOUS BLD VENIPUNCTURE: CPT

## 2019-08-02 PROCEDURE — 80202 ASSAY OF VANCOMYCIN: CPT

## 2019-08-02 RX ORDER — AMOXICILLIN AND CLAVULANATE POTASSIUM 875; 125 MG/1; MG/1
1 TABLET, FILM COATED ORAL 2 TIMES DAILY WITH MEALS
Status: DISCONTINUED | OUTPATIENT
Start: 2019-08-02 | End: 2019-08-02 | Stop reason: HOSPADM

## 2019-08-02 RX ORDER — AMOXICILLIN AND CLAVULANATE POTASSIUM 875; 125 MG/1; MG/1
1 TABLET, FILM COATED ORAL 2 TIMES DAILY WITH MEALS
Qty: 10 TAB | Refills: 0 | Status: SHIPPED | OUTPATIENT
Start: 2019-08-02 | End: 2019-08-07

## 2019-08-02 RX ADMIN — VANCOMYCIN HYDROCHLORIDE 1500 MG: 10 INJECTION, POWDER, LYOPHILIZED, FOR SOLUTION INTRAVENOUS at 09:03

## 2019-08-02 RX ADMIN — PIPERACILLIN SODIUM,TAZOBACTAM SODIUM 3.38 G: 3; .375 INJECTION, POWDER, FOR SOLUTION INTRAVENOUS at 03:06

## 2019-08-02 RX ADMIN — DAKIN'S SOLUTION 0.125% (QUARTER STRENGTH): 0.12 SOLUTION at 09:06

## 2019-08-02 RX ADMIN — ACYCLOVIR 400 MG: 800 TABLET ORAL at 09:03

## 2019-08-02 RX ADMIN — HEPARIN SODIUM 5000 UNITS: 5000 INJECTION INTRAVENOUS; SUBCUTANEOUS at 07:46

## 2019-08-02 NOTE — DISCHARGE SUMMARY
Discharge Summary    Patient: Veronica Fritz               Sex: male          DOA: 7/29/2019       YOB: 1950      Age:  71 y.o.        LOS:  LOS: 4 days                Admit Date: 7/29/2019    Discharge Date: 8/2/2019    Admission Diagnoses: Cellulitis [L03.90]    Discharge Diagnoses:    Hospital Problems  Never Reviewed          Codes Class Noted POA    * (Principal) Cellulitis ICD-10-CM: L03.90  ICD-9-CM: 682.9  7/29/2019 Unknown        A-fib ([address] ICD-10-CM: I48.91  ICD-9-CM: 427.31  7/29/2019 Unknown        KARLO (obstructive sleep apnea) ICD-10-CM: G47.33  ICD-9-CM: 327.23  7/29/2019 Unknown              Discharge Disposition: [facility]     Discharge Condition:  Improved    Hospital Course:  69M w/ h/o KARLO, Afib on pradaxa who was admitted on 7/29 with infected L leg wound/cellulitis. Pt said he had scraped his left leg when he fell off a ladder 5-6 weeks ago. He then developed a likely hematoma which drained and became infected. He was treated with clindamycin x2 weeks but then pain became worse and he began to have foul smelling discharge when a necrotic area partially de-sloughed. He was admitted and started on IV antibiotics. ID and Plastic Surgery were consulted. MRI was negative for osteomyelitis. Plastics debrided the wound at bedside and recommended outpatient follow up in 3 weeks for possible grafting. Wound cx with e coli and streptococci. Per ID recs pt was discharged with Rx for augmentin x 5 days. He was discharged to home with home health in stable condition.     Consults:    Infectious Disease and Plastic Surgery    Labs:  Labs: Results:       Chemistry Recent Labs     08/01/19  0130 07/31/19  0145   * 99    142   K 3.6 3.8    109   CO2 25 28   BUN 9 11   CREA 0.86 0.95   CA 8.2* 8.3*   AGAP 8 5   BUCR 10* 12      CBC w/Diff Recent Labs     08/01/19  0130 07/31/19  0145   WBC 6.6 6.4   RBC 3.76* 3.75*   HGB 11.8* 11.8*   HCT 36.3 36.4    169 GRANS 65 62   LYMPH 21 23   EOS 3 3      Cardiac Enzymes No results for input(s): CPK, CKND1, THERESA in the last 72 hours. No lab exists for component: CKRMB, TROIP   Coagulation No results for input(s): PTP, INR, APTT in the last 72 hours. No lab exists for component: INREXT    Lipid Panel No results found for: CHOL, CHOLPOCT, CHOLX, CHLST, CHOLV, 871888, HDL, LDL, LDLC, DLDLP, 410269, VLDLC, VLDL, TGLX, TRIGL, TRIGP, TGLPOCT, CHHD, CHHDX   BNP No results for input(s): BNPP in the last 72 hours. Liver Enzymes No results for input(s): TP, ALB, TBIL, AP, SGOT, GPT in the last 72 hours. No lab exists for component: DBIL   Thyroid Studies No results found for: T4, T3U, TSH, TSHEXT         Significant Diagnostic Studies:   Xr Tib/fib Lt    Result Date: 7/29/2019  Left tibia/fibula, 4 AP and lateral views: INDICATION: Pain with injury. There is a large irregular ulcerative skin lesion along the lateral distal calf soft tissues. Mild adjacent edema in the subcutaneous fat and loss of tissue planes is likely cellulitis. Underlying fibula is unremarkable. Ankle is normal except for very mild degenerative change. Vascular calcification of the foot vessels. Evidence of prior knee replacement. IMPRESSION: 1. Large ulcerative skin lesion. There are some changes of cellulitis adjacent to this. 2. No underlying bony abnormality. No plain film evidence of osteomyelitis. If that remains a strong concern, MRI or bone scan on a routine basis could be performed. 3. Knee replacement.     Mri Tib/fib Lt W Wo Cont    Result Date: 7/30/2019  MRI EXTREMITY ( INFECTION), lower leg, left History: Swelling signs of inflammation, with ulcer, possible infection and suspected abscess and/or osteomyelitis, for further evaluation MR technique: Sagittal and/or coronal T1 weighted spin echo, STIR fast spin echo, transverse/ axial T1 weighted spin echo, T2 weighted, STIR fast spin echo sequences precontrast . These are complemented with sagittal and/or coronal T1 weighted and transverse/ axial  post contrast fat suppression T1 weighted spin echo sequences as needed for best delineation. No prior MR. Comparison plain films 7/29/2017 MR FINDINGS: There is a deep moderate-sized in the subcutaneous mid anterolateral left lower leg roughly 6 x 4 cm and 1 cm deep. There is a regional area of mixed deep and surrounding rind of nonspecific slightly increased T1 signal minimally complex nonenhancing probably serosanguineous fluid and necrotic granulation tissue encompassing a roughly 14 x 7 cm region over the anterior and lateral subcutaneous fat extending to the fascia. There is associated superficial fascial edema and enhancement and minimal local deep fascial edema and enhancement between and extending minimally into the anterior compartment extensor digitorum and to a lesser extent anterior tibial, and the peroneus brevis muscles. Intact associated tendons. A mild amount of more generalized both enhancing and nonenhancing edema extends over the lower third of the leg The also approaches within 1.2 cm of the fibula however the fibula and the more distant tibia remain of normal marrow signal. MISC: Susceptibility artifact in the proximal tibia consistent with (and bilateral) tibial component of total knee arthroplasties. Grossly unremarkable ankle joint     IMPRESSION: 1. Large and deep lateral middistal junction lower leg ulcer with underlying hemorrhage/edema and rind of subcutaneous devitalized granulation tissue. 2. Superficial local cellulitis, and mild local anterior compartment deep fasciitis and minimal myositis. No evidence of tibiofibular osteomyelitis. Discharge Medications:     Current Discharge Medication List      START taking these medications    Details   amoxicillin-clavulanate (AUGMENTIN) 875-125 mg per tablet Take 1 Tab by mouth two (2) times daily (with meals) for 5 days.   Qty: 10 Tab, Refills: 0         CONTINUE these medications which have NOT CHANGED    Details   dabigatran etexilate (PRADAXA) 150 mg capsule Take 150 mg by mouth every twelve (12) hours. acyclovir (ZOVIRAX) 400 mg tablet Take 400 mg by mouth every four (4) hours (while awake). pravastatin (PRAVACHOL) 40 mg tablet Take 40 mg by mouth nightly. fluticasone propionate (FLOVENT HFA) 110 mcg/actuation inhaler Take  by inhalation. STOP taking these medications       naproxen sodium (ALEVE) 220 mg tablet Comments:   Reason for Stopping:               Activity: Activity as tolerated    Diet: Cardiac Diet    Wound Care: As directed    Follow-up: PCP in 1 week.          Total time spent including time spent on final examination and discharge discussion, discharge documentation and records reviewed and medication reconciliation: > 30 minutes    Cara Blanca MD  [facility]

## 2019-08-02 NOTE — PROGRESS NOTES
Problem: Discharge Planning  Goal: *Discharge to safe environment  Outcome: Resolved/Met   Plan home with hh    Pt dc'd home with bid dsg changes, foc  Done, referral in [facility] for b[facility], called, spoke with devyn, told her pt needs bid dsg changes, he will learn. Needs to be seen tomorrow am, can they see, she stated they can see in am. Gave her instructions for parking in pts [facility] community that he gave me, also placed them in referral note. [facility] & [address] Provider list has been given to the patient and/or patient representative. Patient and/or patient representative has signed the [place] of Choice selecting ______________[facility]___as their preference agency and a copy given. Both Home Health Provider list and Freedom of Choice have been placed on the chart. Care Management Interventions  PCP Verified by CM: Yes  Palliative Care Criteria Met (RRAT>21 & CHF Dx)?: No  Mode of Transport at Discharge: Other (see comment)  Transition of Care Consult (CM Consult): Discharge Planning  Discharge Durable Medical Equipment: No  Physical Therapy Consult: No  Occupational Therapy Consult: No  Speech Therapy Consult: No  Current Support Network:  Other  Confirm Follow Up Transport: Family  Plan discussed with Pt/Family/Caregiver: Yes  Discharge Location  Discharge Placement: Home with home health

## 2019-08-02 NOTE — PROGRESS NOTES
Infectious Disease Follow-up Note      Date of Admission: 7/29/2019     Date of Note:  8/2/2019      Summary:     71year-old CM w/ CAF, KARLO adm 7/29 w/ infected L leg wound / cellulitis. Scraped when fell off ladder 5-6  weeks PTA. Later red, hot, painful- better w/ clindamycin x 2 weeks but worse after stopped 4 days PTA - foul smelling discharge when scab came off and red again. On Vanc, zosyn 7/29. wdcx 7/31 e coli, [facility]. Bedside debridement by Dr. Earnest Joshi 8/1. Interval History:     Debrided yesterday at bedside. Edema and erythema further improved today. Afebrile. Tolerating antibiotics without nausea or diarrhea.     Current Antimicrobials: Prior Antimicrobials   Augmentin 8/2 - 0  Vancomycin, Zosyn 7/29 - 4      Assessment / Plan:      Infected left leg wound, cellulitis  - has large cutaneous defect with surrounding cellulitis  - failed OP clindamycin x 2 weeks  - wd cx 7/30 IP  - erythema slowly improving  - s/p bedside debridement 8/1 by Dr. Earnest Joshi -> dc Vanc, Zosyn today  -> start po Augmentin 875/125 bid x 5 days      Chronic Atrial Fibrillation     KARLO        Microbiology:      7/29      blcx NGTD x 2     Lines / Catheters:      piv    Patient Active Problem List   Diagnosis Code    Cellulitis L03.90    A-fib (HCC) I48.91    KARLO (obstructive sleep apnea) G47.33       Current Facility-Administered Medications   Medication Dose Route Frequency    vancomycin (VANCOCIN) 1500 mg in  ml infusion  1,500 mg IntraVENous Q12H    sodium hypochlorite (QUARTER STRENGTH DAKIN'S) 0.125% irrigation (bottle)   Topical BID    VANCOMYCIN INFORMATION NOTE   Other Rx Dosing/Monitoring    pravastatin (PRAVACHOL) tablet 40 mg  40 mg Oral QHS    acetaminophen (TYLENOL) tablet 650 mg  650 mg Oral Q4H PRN    acyclovir (ZOVIRAX) tablet 400 mg  400 mg Oral BID    heparin (porcine) injection 5,000 Units  5,000 Units SubCUTAneous Q8H    piperacillin-tazobactam (ZOSYN) 3.375 g in 0.9% sodium chloride (MBP/ADV) 100 mL MBP \"EXTENDED 4 HOUR INFUSION###\"   3.375 g IntraVENous Q8H       Review of Systems - Negative except as in interval history       Objective:     Visit Vitals  BP (!) 143/95 (BP 1 Location: Left arm, BP Patient Position: At rest)   Pulse (!) 57   Temp 97.7 °F (36.5 °C)   Resp 18   Ht 6' 1\" (1.854 m)   Wt 96.6 kg (213 lb)   SpO2 99%   BMI 28.10 kg/m²       Temp (24hrs), Av.2 °F (36.8 °C), Min:97.4 °F (36.3 °C), Max:99 °F (37.2 °C)      General: Well developed, well nourished 71 y.o.  male in no acute distress. ENT: ENT exam normal, no neck nodes or sinus tenderness  Head: normocephalic, without obvious abnormality  Mouth:  mucous membranes moist, pharynx normal without lesions  Neck: supple, symmetrical, trachea midline   Cardio:  irregularly irregular rhythm and S1, S2 normal  Chest: inspection normal - no chest wall deformities or tenderness, respiratory effort normal  Lungs: no wheezes or rales  Abdomen: soft, non-tender. Bowel sounds normal. No masses, no organomegaly. Extremities:  Left leg dressing over wound - surrounding erythema less than yesterday.  Edema also decreased    Lab results     Chemistry  Recent Labs     19  014   * 99    142   K 3.6 3.8    109   CO2 25 28   BUN 9 11   CREA 0.86 0.95   CA 8.2* 8.3*   AGAP 8 5   BUCR 10* 12       CBC w/ Diff  Recent Labs     190 19  014   WBC 6.6 6.4   RBC 3.76* 3.75*   HGB 11.8* 11.8*   HCT 36.3 36.4    169   GRANS 65 62   LYMPH 21 23   EOS 3 3       Microbiology  All Micro Results     Procedure Component Value Units Date/Time    CULTURE, Merl Silas STAIN [[id number]]  (Abnormal)  (Susceptibility) Collected:  19    Order Status:  Completed Specimen:  Wound from Leg Updated:  19     Special Requests: NO SPECIAL REQUESTS        GRAM STAIN MODERATE WBC'S         NO ORGANISMS SEEN        Culture result: FEW ESCHERICHIA COLI               FEW POSSIBLE STREPTOCOCCI,NON HEMOLYTIC          CULTURE, BLOOD [[id number]] Collected:  07/29/19 1210    Order Status:  Completed Specimen:  Blood Updated:  08/02/19 0706     Special Requests: NO SPECIAL REQUESTS        Culture result: NO GROWTH 4 DAYS       CULTURE, BLOOD [[id number]] Collected:  07/29/19 1220    Order Status:  Completed Specimen:  Blood Updated:  08/02/19 0706     Special Requests: NO SPECIAL REQUESTS        Culture result: NO GROWTH 4 DAYS                Shalom Lopez MD, [address] [facility]  Infectious Disease Specialist  Pager [phone]

## 2019-08-02 NOTE — ROUTINE PROCESS
Bedside and Verbal shift change report given to Antoine (oncoming nurse) by Tessa Garcia RN 
 (offgoing nurse). Report included the following information SBAR, Kardex and MAR.

## 2019-08-02 NOTE — ANCILLARY DISCHARGE INSTRUCTIONS
Patient and/or next of kin has been given the [facility] Important Message From Medicare About Your Rights\" letter and all questions were answered.

## 2019-08-02 NOTE — PROGRESS NOTES
Wound debrided under sterile conditions - sharp debridement of dead skin proximally - this permitted the old haematoma to be \"scooped\" out with little sign of infection -considerable undermining proximally under a flap of viable skin - deep fascia intact - early granulations present now - wound could be managed as an OP when IV antibiotic treatment complete.

## 2019-08-02 NOTE — PROGRESS NOTES
conducted a Follow up consultation and Spiritual Assessment for Caprice García, who is a 71 y.o.,male. The  provided the following Interventions:  Continued the relationship of care and support. Listened empathically. Offered prayer and assurance of continued prayer on patients behalf. Chart reviewed. The following outcomes were achieved:  Patient expressed gratitude for pastoral care visit. Assessment:  There are no further spiritual or Nondenominational issues which require Spiritual Care Services interventions at this time. Plan:  Chaplains will continue to follow and will provide pastoral care on an as needed/requested basis.  recommends bedside caregivers page  on duty if patient shows signs of acute spiritual or emotional distress.      [address] [facility]   Staff [address] [facility]   [phone]

## 2019-08-02 NOTE — DISCHARGE INSTRUCTIONS
DISCHARGE SUMMARY from Nurse    PATIENT INSTRUCTIONS:    After general anesthesia or intravenous sedation, for 24 hours or while taking prescription Narcotics:  · Limit your activities  · Do not drive and operate hazardous machinery  · Do not make important personal or business decisions  · Do  not drink alcoholic beverages  · If you have not urinated within 8 hours after discharge, please contact your surgeon on call. Report the following to your surgeon:  · Excessive pain, swelling, redness or odor of or around the surgical area  · Temperature over 100.5  · Nausea and vomiting lasting longer than 4 hours or if unable to take medications  · Any signs of decreased circulation or nerve impairment to extremity: change in color, persistent  numbness, tingling, coldness or increase pain  · Any questions? What to do at Home:  Recommended activity: as recommended by doctor. *  Please give a list of your current medications to your Primary Care Provider. *  Please update this list whenever your medications are discontinued, doses are      changed, or new medications (including over-the-counter products) are added. *  Please carry medication information at all times in case of emergency situations. These are general instructions for a healthy lifestyle:    No smoking/ No tobacco products/ Avoid exposure to second hand smoke  Surgeon General's Warning:  Quitting smoking now greatly reduces serious risk to your health. Obesity, smoking, and sedentary lifestyle greatly increases your risk for illness    A healthy diet, regular physical exercise & weight monitoring are important for maintaining a healthy lifestyle    You may be retaining fluid if you have a history of heart failure or if you experience any of the following symptoms:  Weight gain of 3 pounds or more overnight or 5 pounds in a week, increased swelling in our hands or feet or shortness of breath while lying flat in bed.   Please call your doctor as soon as you notice any of these symptoms; do not wait until your next office visit. Patient armband removed and shredded    The discharge information has been reviewed with the patient. The patient verbalized understanding. Discharge medications reviewed with the patient and appropriate educational materials and side effects teaching were provided.   ___________________________________________________________________________________________________________________________________

## 2019-08-02 NOTE — PROGRESS NOTES
2000 Dr Cormier Oh debrided patient's left lower leg wound & redressed. Wife with patient. Left leg elevated on pillows. Left foot warm to touch with positive sensation. Moves toes well.

## 2019-08-03 ENCOUNTER — HOME CARE VISIT (OUTPATIENT)
Dept: SCHEDULING | Facility: HOME HEALTH | Age: 69
End: 2019-08-03
Payer: MEDICARE

## 2019-08-03 LAB
BACTERIA SPEC CULT: ABNORMAL
BACTERIA SPEC CULT: ABNORMAL
GRAM STN SPEC: ABNORMAL
GRAM STN SPEC: ABNORMAL
SERVICE CMNT-IMP: ABNORMAL

## 2019-08-03 PROCEDURE — G0299 HHS/HOSPICE OF RN EA 15 MIN: HCPCS

## 2019-08-03 PROCEDURE — 400013 HH SOC

## 2019-08-03 PROCEDURE — 3331090002 HH PPS REVENUE DEBIT

## 2019-08-03 PROCEDURE — 3331090001 HH PPS REVENUE CREDIT

## 2019-08-04 ENCOUNTER — HOME CARE VISIT (OUTPATIENT)
Dept: SCHEDULING | Facility: HOME HEALTH | Age: 69
End: 2019-08-04
Payer: MEDICARE

## 2019-08-04 LAB
BACTERIA SPEC CULT: NORMAL
BACTERIA SPEC CULT: NORMAL
SERVICE CMNT-IMP: NORMAL
SERVICE CMNT-IMP: NORMAL

## 2019-08-04 PROCEDURE — 3331090002 HH PPS REVENUE DEBIT

## 2019-08-04 PROCEDURE — G0299 HHS/HOSPICE OF RN EA 15 MIN: HCPCS

## 2019-08-04 PROCEDURE — 3331090001 HH PPS REVENUE CREDIT

## 2019-08-05 ENCOUNTER — HOME CARE VISIT (OUTPATIENT)
Dept: HOME HEALTH SERVICES | Facility: HOME HEALTH | Age: 69
End: 2019-08-05
Payer: MEDICARE

## 2019-08-05 VITALS
TEMPERATURE: 97.8 F | DIASTOLIC BLOOD PRESSURE: 60 MMHG | OXYGEN SATURATION: 97 % | TEMPERATURE: 98.1 F | DIASTOLIC BLOOD PRESSURE: 60 MMHG | OXYGEN SATURATION: 98 % | HEART RATE: 66 BPM | HEART RATE: 65 BPM | SYSTOLIC BLOOD PRESSURE: 120 MMHG | RESPIRATION RATE: 14 BRPM | SYSTOLIC BLOOD PRESSURE: 100 MMHG | RESPIRATION RATE: 14 BRPM

## 2019-08-05 PROCEDURE — 3331090002 HH PPS REVENUE DEBIT

## 2019-08-05 PROCEDURE — 3331090001 HH PPS REVENUE CREDIT

## 2019-08-05 PROCEDURE — A6216 NON-STERILE GAUZE<=16 SQ IN: HCPCS

## 2019-08-05 PROCEDURE — A6260 WOUND CLEANSER ANY TYPE/SIZE: HCPCS

## 2019-08-05 PROCEDURE — A9270 NON-COVERED ITEM OR SERVICE: HCPCS

## 2019-08-05 NOTE — CDMP QUERY
Patient admitted with Cellulitis of left leg wound. Progress note documentation of debridement of dead skin Your bedside debridement note states \"sharp debridement of dead skin proximally \". Please clarify the following. 
 
=>NON EXCISIONAL DEBRIDEMENT (Minor removal of loose fragments with scissors, Scraping, Whirlpool, Pulse lavage, Mechanical irrigation, High pressure irrigation) =>EXCISIONAL DEBRIDEMENT (definite cutting away of tissue) Specify the depth of tissue that was actually excised (skin, subcutaneous tissue, fascia, soft tissue, 
muscle, tendon, bone) The medical record reflects the following: 
  Risk Factors: 70 yo male admitted with cellulitis of left leg Clinical Indicators: Per PN  8/1  Wound debrided under sterile conditions - sharp debridement of dead skin proximally Treatment: Wound debridement Thank you, 
Tova Saenz RN CDI   [phone]

## 2019-08-06 ENCOUNTER — HOME CARE VISIT (OUTPATIENT)
Dept: SCHEDULING | Facility: HOME HEALTH | Age: 69
End: 2019-08-06
Payer: MEDICARE

## 2019-08-06 PROCEDURE — 3331090001 HH PPS REVENUE CREDIT

## 2019-08-06 PROCEDURE — G0299 HHS/HOSPICE OF RN EA 15 MIN: HCPCS

## 2019-08-06 PROCEDURE — 3331090002 HH PPS REVENUE DEBIT

## 2019-08-07 ENCOUNTER — HOME CARE VISIT (OUTPATIENT)
Dept: SCHEDULING | Facility: HOME HEALTH | Age: 69
End: 2019-08-07
Payer: MEDICARE

## 2019-08-07 VITALS
DIASTOLIC BLOOD PRESSURE: 62 MMHG | OXYGEN SATURATION: 98 % | TEMPERATURE: 97.5 F | HEART RATE: 58 BPM | SYSTOLIC BLOOD PRESSURE: 102 MMHG | RESPIRATION RATE: 16 BRPM

## 2019-08-07 PROCEDURE — G0299 HHS/HOSPICE OF RN EA 15 MIN: HCPCS

## 2019-08-07 PROCEDURE — 3331090002 HH PPS REVENUE DEBIT

## 2019-08-07 PROCEDURE — 3331090001 HH PPS REVENUE CREDIT

## 2019-08-07 NOTE — PROGRESS NOTES
As stated on the 8/1/2019 note - the debridement was done with a sharp scalpel excising dead skin and subcutaneous fat - no deep fascia was excised and no muscle nor one - copious old organized hematoma was also \"scooped\" out proximally - this as finite as possible

## 2019-08-08 ENCOUNTER — HOME CARE VISIT (OUTPATIENT)
Dept: SCHEDULING | Facility: HOME HEALTH | Age: 69
End: 2019-08-08
Payer: MEDICARE

## 2019-08-08 VITALS
TEMPERATURE: 98.8 F | HEART RATE: 60 BPM | DIASTOLIC BLOOD PRESSURE: 76 MMHG | OXYGEN SATURATION: 98 % | RESPIRATION RATE: 20 BRPM | SYSTOLIC BLOOD PRESSURE: 116 MMHG

## 2019-08-08 VITALS
TEMPERATURE: 97.4 F | DIASTOLIC BLOOD PRESSURE: 62 MMHG | OXYGEN SATURATION: 98 % | HEART RATE: 86 BPM | RESPIRATION RATE: 18 BRPM | SYSTOLIC BLOOD PRESSURE: 110 MMHG

## 2019-08-08 PROCEDURE — 3331090001 HH PPS REVENUE CREDIT

## 2019-08-08 PROCEDURE — A6446 CONFORM BAND S W>=3" <5"/YD: HCPCS

## 2019-08-08 PROCEDURE — G0300 HHS/HOSPICE OF LPN EA 15 MIN: HCPCS

## 2019-08-08 PROCEDURE — 3331090002 HH PPS REVENUE DEBIT

## 2019-08-08 PROCEDURE — A6216 NON-STERILE GAUZE<=16 SQ IN: HCPCS

## 2019-08-08 PROCEDURE — A4927 NON-STERILE GLOVES: HCPCS

## 2019-08-09 PROCEDURE — 3331090001 HH PPS REVENUE CREDIT

## 2019-08-09 PROCEDURE — 3331090002 HH PPS REVENUE DEBIT

## 2019-08-10 PROCEDURE — 3331090002 HH PPS REVENUE DEBIT

## 2019-08-10 PROCEDURE — 3331090001 HH PPS REVENUE CREDIT

## 2019-08-11 ENCOUNTER — HOME CARE VISIT (OUTPATIENT)
Dept: SCHEDULING | Facility: HOME HEALTH | Age: 69
End: 2019-08-11
Payer: MEDICARE

## 2019-08-11 PROCEDURE — 3331090002 HH PPS REVENUE DEBIT

## 2019-08-11 PROCEDURE — G0299 HHS/HOSPICE OF RN EA 15 MIN: HCPCS

## 2019-08-11 PROCEDURE — 3331090001 HH PPS REVENUE CREDIT

## 2019-08-12 VITALS
RESPIRATION RATE: 14 BRPM | TEMPERATURE: 97.8 F | OXYGEN SATURATION: 98 % | SYSTOLIC BLOOD PRESSURE: 120 MMHG | DIASTOLIC BLOOD PRESSURE: 70 MMHG | HEART RATE: 80 BPM

## 2019-08-12 PROCEDURE — 3331090001 HH PPS REVENUE CREDIT

## 2019-08-12 PROCEDURE — 3331090002 HH PPS REVENUE DEBIT

## 2019-08-13 ENCOUNTER — HOME CARE VISIT (OUTPATIENT)
Dept: SCHEDULING | Facility: HOME HEALTH | Age: 69
End: 2019-08-13
Payer: MEDICARE

## 2019-08-13 VITALS
HEART RATE: 68 BPM | SYSTOLIC BLOOD PRESSURE: 122 MMHG | TEMPERATURE: 97.7 F | DIASTOLIC BLOOD PRESSURE: 62 MMHG | OXYGEN SATURATION: 96 %

## 2019-08-13 PROCEDURE — 3331090001 HH PPS REVENUE CREDIT

## 2019-08-13 PROCEDURE — G0299 HHS/HOSPICE OF RN EA 15 MIN: HCPCS

## 2019-08-13 PROCEDURE — 3331090002 HH PPS REVENUE DEBIT

## 2019-08-14 PROCEDURE — 3331090002 HH PPS REVENUE DEBIT

## 2019-08-14 PROCEDURE — 3331090001 HH PPS REVENUE CREDIT

## 2019-08-15 ENCOUNTER — HOME CARE VISIT (OUTPATIENT)
Dept: SCHEDULING | Facility: HOME HEALTH | Age: 69
End: 2019-08-15
Payer: MEDICARE

## 2019-08-15 VITALS
HEART RATE: 61 BPM | RESPIRATION RATE: 16 BRPM | DIASTOLIC BLOOD PRESSURE: 78 MMHG | TEMPERATURE: 97.8 F | SYSTOLIC BLOOD PRESSURE: 128 MMHG | OXYGEN SATURATION: 97 %

## 2019-08-15 PROCEDURE — G0299 HHS/HOSPICE OF RN EA 15 MIN: HCPCS

## 2019-08-15 PROCEDURE — 3331090002 HH PPS REVENUE DEBIT

## 2019-08-15 PROCEDURE — 3331090001 HH PPS REVENUE CREDIT

## 2019-08-16 PROCEDURE — 3331090002 HH PPS REVENUE DEBIT

## 2019-08-16 PROCEDURE — 3331090001 HH PPS REVENUE CREDIT

## 2019-08-17 PROCEDURE — 3331090002 HH PPS REVENUE DEBIT

## 2019-08-17 PROCEDURE — 3331090001 HH PPS REVENUE CREDIT

## 2019-08-18 ENCOUNTER — HOME CARE VISIT (OUTPATIENT)
Dept: SCHEDULING | Facility: HOME HEALTH | Age: 69
End: 2019-08-18
Payer: MEDICARE

## 2019-08-18 PROCEDURE — 3331090001 HH PPS REVENUE CREDIT

## 2019-08-18 PROCEDURE — G0299 HHS/HOSPICE OF RN EA 15 MIN: HCPCS

## 2019-08-18 PROCEDURE — 3331090002 HH PPS REVENUE DEBIT

## 2019-08-19 VITALS
SYSTOLIC BLOOD PRESSURE: 118 MMHG | DIASTOLIC BLOOD PRESSURE: 70 MMHG | TEMPERATURE: 97.8 F | HEART RATE: 60 BPM | OXYGEN SATURATION: 97 % | RESPIRATION RATE: 16 BRPM

## 2019-08-19 PROCEDURE — 3331090001 HH PPS REVENUE CREDIT

## 2019-08-19 PROCEDURE — 3331090002 HH PPS REVENUE DEBIT

## 2019-08-20 PROCEDURE — 3331090002 HH PPS REVENUE DEBIT

## 2019-08-20 PROCEDURE — 3331090001 HH PPS REVENUE CREDIT

## 2019-08-21 ENCOUNTER — HOME CARE VISIT (OUTPATIENT)
Dept: SCHEDULING | Facility: HOME HEALTH | Age: 69
End: 2019-08-21
Payer: MEDICARE

## 2019-08-21 VITALS
TEMPERATURE: 97.8 F | RESPIRATION RATE: 18 BRPM | DIASTOLIC BLOOD PRESSURE: 64 MMHG | OXYGEN SATURATION: 98 % | SYSTOLIC BLOOD PRESSURE: 128 MMHG | HEART RATE: 76 BPM

## 2019-08-21 PROCEDURE — 3331090001 HH PPS REVENUE CREDIT

## 2019-08-21 PROCEDURE — G0299 HHS/HOSPICE OF RN EA 15 MIN: HCPCS

## 2019-08-21 PROCEDURE — 3331090002 HH PPS REVENUE DEBIT

## 2019-08-22 PROCEDURE — 3331090001 HH PPS REVENUE CREDIT

## 2019-08-22 PROCEDURE — 3331090002 HH PPS REVENUE DEBIT

## 2019-08-23 ENCOUNTER — HOME CARE VISIT (OUTPATIENT)
Dept: SCHEDULING | Facility: HOME HEALTH | Age: 69
End: 2019-08-23
Payer: MEDICARE

## 2019-08-23 VITALS
SYSTOLIC BLOOD PRESSURE: 128 MMHG | TEMPERATURE: 97.5 F | RESPIRATION RATE: 16 BRPM | OXYGEN SATURATION: 98 % | HEART RATE: 57 BPM | DIASTOLIC BLOOD PRESSURE: 76 MMHG

## 2019-08-23 PROCEDURE — 3331090002 HH PPS REVENUE DEBIT

## 2019-08-23 PROCEDURE — 3331090001 HH PPS REVENUE CREDIT

## 2019-08-23 PROCEDURE — G0299 HHS/HOSPICE OF RN EA 15 MIN: HCPCS

## 2019-08-24 PROCEDURE — 3331090001 HH PPS REVENUE CREDIT

## 2019-08-24 PROCEDURE — 3331090002 HH PPS REVENUE DEBIT

## 2019-08-25 ENCOUNTER — HOME CARE VISIT (OUTPATIENT)
Dept: SCHEDULING | Facility: HOME HEALTH | Age: 69
End: 2019-08-25
Payer: MEDICARE

## 2019-08-25 PROCEDURE — 3331090002 HH PPS REVENUE DEBIT

## 2019-08-25 PROCEDURE — G0299 HHS/HOSPICE OF RN EA 15 MIN: HCPCS

## 2019-08-25 PROCEDURE — 3331090001 HH PPS REVENUE CREDIT

## 2019-08-26 VITALS
DIASTOLIC BLOOD PRESSURE: 70 MMHG | TEMPERATURE: 97.4 F | RESPIRATION RATE: 14 BRPM | OXYGEN SATURATION: 98 % | HEART RATE: 72 BPM | SYSTOLIC BLOOD PRESSURE: 130 MMHG

## 2019-08-26 PROCEDURE — A4649 SURGICAL SUPPLIES: HCPCS

## 2019-08-26 PROCEDURE — 3331090001 HH PPS REVENUE CREDIT

## 2019-08-26 PROCEDURE — 3331090002 HH PPS REVENUE DEBIT

## 2019-08-27 ENCOUNTER — HOME CARE VISIT (OUTPATIENT)
Dept: SCHEDULING | Facility: HOME HEALTH | Age: 69
End: 2019-08-27
Payer: MEDICARE

## 2019-08-27 VITALS
TEMPERATURE: 98.7 F | OXYGEN SATURATION: 98 % | HEART RATE: 72 BPM | RESPIRATION RATE: 20 BRPM | DIASTOLIC BLOOD PRESSURE: 72 MMHG | SYSTOLIC BLOOD PRESSURE: 116 MMHG

## 2019-08-27 PROCEDURE — 3331090002 HH PPS REVENUE DEBIT

## 2019-08-27 PROCEDURE — G0300 HHS/HOSPICE OF LPN EA 15 MIN: HCPCS

## 2019-08-27 PROCEDURE — 3331090001 HH PPS REVENUE CREDIT

## 2019-08-28 PROCEDURE — 3331090001 HH PPS REVENUE CREDIT

## 2019-08-28 PROCEDURE — 3331090002 HH PPS REVENUE DEBIT

## 2019-08-29 ENCOUNTER — HOME CARE VISIT (OUTPATIENT)
Dept: SCHEDULING | Facility: HOME HEALTH | Age: 69
End: 2019-08-29
Payer: MEDICARE

## 2019-08-29 VITALS
DIASTOLIC BLOOD PRESSURE: 70 MMHG | TEMPERATURE: 98.3 F | OXYGEN SATURATION: 98 % | SYSTOLIC BLOOD PRESSURE: 118 MMHG | RESPIRATION RATE: 18 BRPM | HEART RATE: 72 BPM

## 2019-08-29 PROCEDURE — 3331090002 HH PPS REVENUE DEBIT

## 2019-08-29 PROCEDURE — 3331090001 HH PPS REVENUE CREDIT

## 2019-08-29 PROCEDURE — G0300 HHS/HOSPICE OF LPN EA 15 MIN: HCPCS

## 2019-08-30 PROCEDURE — 3331090002 HH PPS REVENUE DEBIT

## 2019-08-30 PROCEDURE — 3331090001 HH PPS REVENUE CREDIT

## 2019-08-31 PROCEDURE — 3331090001 HH PPS REVENUE CREDIT

## 2019-08-31 PROCEDURE — 3331090002 HH PPS REVENUE DEBIT

## 2019-09-01 PROCEDURE — 3331090002 HH PPS REVENUE DEBIT

## 2019-09-01 PROCEDURE — 3331090001 HH PPS REVENUE CREDIT

## 2019-09-02 ENCOUNTER — HOME CARE VISIT (OUTPATIENT)
Dept: SCHEDULING | Facility: HOME HEALTH | Age: 69
End: 2019-09-02
Payer: MEDICARE

## 2019-09-02 VITALS
OXYGEN SATURATION: 98 % | TEMPERATURE: 97.7 F | SYSTOLIC BLOOD PRESSURE: 138 MMHG | HEART RATE: 72 BPM | DIASTOLIC BLOOD PRESSURE: 62 MMHG

## 2019-09-02 PROCEDURE — G0299 HHS/HOSPICE OF RN EA 15 MIN: HCPCS

## 2019-09-02 PROCEDURE — 3331090002 HH PPS REVENUE DEBIT

## 2019-09-02 PROCEDURE — 3331090001 HH PPS REVENUE CREDIT

## 2019-09-03 ENCOUNTER — ANESTHESIA EVENT (OUTPATIENT)
Dept: SURGERY | Age: 69
DRG: 577 | End: 2019-09-03
Payer: MEDICARE

## 2019-09-03 ENCOUNTER — HOSPITAL ENCOUNTER (OUTPATIENT)
Dept: GENERAL RADIOLOGY | Age: 69
Discharge: HOME | DRG: 577 | End: 2019-09-03
Payer: MEDICARE

## 2019-09-03 ENCOUNTER — HOME CARE VISIT (OUTPATIENT)
Dept: SCHEDULING | Facility: HOME HEALTH | Age: 69
End: 2019-09-03
Payer: MEDICARE

## 2019-09-03 ENCOUNTER — HOSPITAL ENCOUNTER (OUTPATIENT)
Dept: PREADMISSION TESTING | Age: 69
Discharge: HOME | DRG: 577 | End: 2019-09-03
Payer: MEDICARE

## 2019-09-03 DIAGNOSIS — Z01.818 PREOPERATIVE TESTING: ICD-10-CM

## 2019-09-03 LAB
ALBUMIN SERPL-MCNC: 3.9 G/DL (ref 3.4–5)
ALBUMIN/GLOB SERPL: 1.1 {RATIO} (ref 0.8–1.7)
ALP SERPL-CCNC: 65 U/L (ref 45–117)
ALT SERPL-CCNC: 27 U/L (ref 16–61)
ANION GAP SERPL CALC-SCNC: 2 MMOL/L (ref 3–18)
APPEARANCE UR: CLEAR
AST SERPL-CCNC: 28 U/L (ref 10–38)
BILIRUB SERPL-MCNC: 0.3 MG/DL (ref 0.2–1)
BILIRUB UR QL: NEGATIVE
BUN SERPL-MCNC: 18 MG/DL (ref 7–18)
BUN/CREAT SERPL: 18 (ref 12–20)
CALCIUM SERPL-MCNC: 9.1 MG/DL (ref 8.5–10.1)
CHLORIDE SERPL-SCNC: 105 MMOL/L (ref 100–111)
CO2 SERPL-SCNC: 34 MMOL/L (ref 21–32)
COLOR UR: YELLOW
CREAT SERPL-MCNC: 0.98 MG/DL (ref 0.6–1.3)
ERYTHROCYTE [DISTWIDTH] IN BLOOD BY AUTOMATED COUNT: 13.8 % (ref 11.6–14.5)
GLOBULIN SER CALC-MCNC: 3.4 G/DL (ref 2–4)
GLUCOSE SERPL-MCNC: 101 MG/DL (ref 74–99)
GLUCOSE UR STRIP.AUTO-MCNC: NEGATIVE MG/DL
HCT VFR BLD AUTO: 40.5 % (ref 36–48)
HGB BLD-MCNC: 13.1 G/DL (ref 13–16)
HGB UR QL STRIP: NEGATIVE
KETONES UR QL STRIP.AUTO: NEGATIVE MG/DL
LEUKOCYTE ESTERASE UR QL STRIP.AUTO: NEGATIVE
MCH RBC QN AUTO: 31.1 PG (ref 24–34)
MCHC RBC AUTO-ENTMCNC: 32.3 G/DL (ref 31–37)
MCV RBC AUTO: 96.2 FL (ref 74–97)
NITRITE UR QL STRIP.AUTO: NEGATIVE
PH UR STRIP: 5 [PH] (ref 5–8)
PLATELET # BLD AUTO: 163 K/UL (ref 135–420)
PMV BLD AUTO: 10.5 FL (ref 9.2–11.8)
POTASSIUM SERPL-SCNC: 4.1 MMOL/L (ref 3.5–5.5)
PROT SERPL-MCNC: 7.3 G/DL (ref 6.4–8.2)
PROT UR STRIP-MCNC: NEGATIVE MG/DL
RBC # BLD AUTO: 4.21 M/UL (ref 4.7–5.5)
SODIUM SERPL-SCNC: 141 MMOL/L (ref 136–145)
SP GR UR REFRACTOMETRY: 1.02 (ref 1–1.03)
UROBILINOGEN UR QL STRIP.AUTO: 0.2 EU/DL (ref 0.2–1)
WBC # BLD AUTO: 5.8 K/UL (ref 4.6–13.2)

## 2019-09-03 PROCEDURE — 85027 COMPLETE CBC AUTOMATED: CPT

## 2019-09-03 PROCEDURE — 3331090002 HH PPS REVENUE DEBIT

## 2019-09-03 PROCEDURE — 80053 COMPREHEN METABOLIC PANEL: CPT

## 2019-09-03 PROCEDURE — 71046 X-RAY EXAM CHEST 2 VIEWS: CPT

## 2019-09-03 PROCEDURE — 3331090001 HH PPS REVENUE CREDIT

## 2019-09-03 PROCEDURE — 81003 URINALYSIS AUTO W/O SCOPE: CPT

## 2019-09-03 PROCEDURE — G0299 HHS/HOSPICE OF RN EA 15 MIN: HCPCS

## 2019-09-03 PROCEDURE — 36415 COLL VENOUS BLD VENIPUNCTURE: CPT

## 2019-09-03 NOTE — PERIOP NOTES
PAT - SURGICAL PRE-ADMISSION INSTRUCTIONS    NAME:  Min Ramirez                                                          TODAY'S DATE:  9/3/2019    SURGERY DATE:  9/4/2019                                  SURGERY ARRIVAL TIME:   TBV after 3:00 pm tonight    1. Do NOT eat or drink anything, including candy or gum, after MIDNIGHT on 09/03/2019 , unless you have specific instructions from your Surgeon or Anesthesia Provider to do so. 2. No smoking on the day of surgery. 3. No alcohol 24 hours prior to the day of surgery. 4. No recreational drugs for one week prior to the day of surgery. 5. Leave all valuables, including money/purse, at home. 6. Remove all jewelry, nail polish, makeup (including mascara); no lotions, powders, deodorant, or perfume/cologne/after shave. 7. Glasses/Contact lenses and Dentures may be worn to the hospital.  They will be removed prior to surgery. 8. Call your doctor if symptoms of a cold or illness develop within 24 ours prior to surgery. 9. AN ADULT MUST DRIVE YOU HOME AFTER OUTPATIENT SURGERY. 10. If you are having an OUTPATIENT procedure, please make arrangements for a responsible adult to be with you for 24 hours after your surgery. 11. If you are admitted to the hospital, you will be assigned to a bed after surgery is complete. Normally a family member will not be able to see you until you are in your assigned bed. 15. Family is encouraged to accompany you to the hospital.  We ask visitors in the treatment area to be limited to ONE person at a time to ensure patient privacy. EXCEPTIONS WILL BE MADE AS NEEDED. 15. Children under 12 are discouraged from entering the treatment area and need to be supervised by an adult when in the waiting room.     Special Instructions:    Bring CPAP., STOP anticoagulants AT LEAST 1 WEEK PRIOR to your surgery or, follow other MD instructions:  Stopped Pradaxa on Saturday    Patient Prep:    use CHG solution    These surgical instructions were reviewed with Kaveh Murray during the PAT visit. A printed copy of the instructions was provided to Kaveh Murray. Directions: On the morning of surgery, please go to the [facility]. Enter the building from the [facility] entrance, 1st floor (next to the Emergency Room entrance). Take the elevator to the 2nd floor. Sign in at the Registration Desk.     If you have any questions and/or concerns, please do not hesitate to call:  (Prior to the day of surgery)  [facility] unit:  [phone]  (Day of surgery)  [facility] unit:  [phone]

## 2019-09-04 ENCOUNTER — ANESTHESIA (OUTPATIENT)
Dept: SURGERY | Age: 69
DRG: 577 | End: 2019-09-04
Payer: MEDICARE

## 2019-09-04 ENCOUNTER — HOSPITAL ENCOUNTER (INPATIENT)
Age: 69
LOS: 8 days | Discharge: HOME | DRG: 577 | End: 2019-09-12
Attending: PLASTIC SURGERY | Admitting: PLASTIC SURGERY
Payer: MEDICARE

## 2019-09-04 PROBLEM — T14.90XA TRAUMA: Status: ACTIVE | Noted: 2019-09-04

## 2019-09-04 PROBLEM — S89.92XA TRAUMATIC LEG INJURY, LEFT, INITIAL ENCOUNTER: Status: ACTIVE | Noted: 2019-09-04

## 2019-09-04 PROBLEM — L03.90 CELLULITIS: Status: ACTIVE | Noted: 2019-09-04

## 2019-09-04 PROBLEM — Z94.5 STATUS POST FULL THICKNESS SKIN GRAFT: Status: ACTIVE | Noted: 2019-09-04

## 2019-09-04 PROCEDURE — 76060000034 HC ANESTHESIA 1.5 TO 2 HR: Performed by: PLASTIC SURGERY

## 2019-09-04 PROCEDURE — 77030008462 HC STPLR SKN PROX J&J -A: Performed by: PLASTIC SURGERY

## 2019-09-04 PROCEDURE — 77030002916 HC SUT ETHLN J&J -A: Performed by: PLASTIC SURGERY

## 2019-09-04 PROCEDURE — 77030013395: Performed by: PLASTIC SURGERY

## 2019-09-04 PROCEDURE — 74011000250 HC RX REV CODE- 250: Performed by: PLASTIC SURGERY

## 2019-09-04 PROCEDURE — 77030013079 HC BLNKT BAIR HGGR 3M -A: Performed by: ANESTHESIOLOGY

## 2019-09-04 PROCEDURE — 77030018831 HC SOL IRR H20 BAXT -A: Performed by: PLASTIC SURGERY

## 2019-09-04 PROCEDURE — 74011000272 HC RX REV CODE- 272: Performed by: PLASTIC SURGERY

## 2019-09-04 PROCEDURE — 74011250636 HC RX REV CODE- 250/636

## 2019-09-04 PROCEDURE — 88304 TISSUE EXAM BY PATHOLOGIST: CPT

## 2019-09-04 PROCEDURE — 74011250636 HC RX REV CODE- 250/636: Performed by: NURSE ANESTHETIST, CERTIFIED REGISTERED

## 2019-09-04 PROCEDURE — 74011250636 HC RX REV CODE- 250/636: Performed by: PLASTIC SURGERY

## 2019-09-04 PROCEDURE — 3331090001 HH PPS REVENUE CREDIT

## 2019-09-04 PROCEDURE — 76010000162 HC OR TIME 1.5 TO 2 HR INTENSV-TIER 1: Performed by: PLASTIC SURGERY

## 2019-09-04 PROCEDURE — 65270000029 HC RM PRIVATE

## 2019-09-04 PROCEDURE — 77030032490 HC SLV COMPR SCD KNE COVD -B: Performed by: PLASTIC SURGERY

## 2019-09-04 PROCEDURE — 77030012510 HC MSK AIRWY LMA TELE -B: Performed by: ANESTHESIOLOGY

## 2019-09-04 PROCEDURE — 3331090002 HH PPS REVENUE DEBIT

## 2019-09-04 PROCEDURE — 94640 AIRWAY INHALATION TREATMENT: CPT

## 2019-09-04 PROCEDURE — 77030006626 HC BLD DERMATOME INLC -B: Performed by: PLASTIC SURGERY

## 2019-09-04 PROCEDURE — 74011636320 HC RX REV CODE- 636/320: Performed by: PLASTIC SURGERY

## 2019-09-04 PROCEDURE — 74011250637 HC RX REV CODE- 250/637: Performed by: PLASTIC SURGERY

## 2019-09-04 PROCEDURE — 74011250637 HC RX REV CODE- 250/637: Performed by: NURSE ANESTHETIST, CERTIFIED REGISTERED

## 2019-09-04 PROCEDURE — 0JBP0ZZ EXCISION OF LEFT LOWER LEG SUBCUTANEOUS TISSUE AND FASCIA, OPEN APPROACH: ICD-10-PCS | Performed by: PLASTIC SURGERY

## 2019-09-04 PROCEDURE — 0HXLXZZ TRANSFER LEFT LOWER LEG SKIN, EXTERNAL APPROACH: ICD-10-PCS | Performed by: PLASTIC SURGERY

## 2019-09-04 PROCEDURE — 76210000017 HC OR PH I REC 1.5 TO 2 HR: Performed by: PLASTIC SURGERY

## 2019-09-04 PROCEDURE — 94761 N-INVAS EAR/PLS OXIMETRY MLT: CPT

## 2019-09-04 RX ORDER — HYDROMORPHONE HYDROCHLORIDE 1 MG/ML
INJECTION, SOLUTION INTRAMUSCULAR; INTRAVENOUS; SUBCUTANEOUS
Status: COMPLETED
Start: 2019-09-04 | End: 2019-09-04

## 2019-09-04 RX ORDER — DEXAMETHASONE SODIUM PHOSPHATE 4 MG/ML
INJECTION, SOLUTION INTRA-ARTICULAR; INTRALESIONAL; INTRAMUSCULAR; INTRAVENOUS; SOFT TISSUE AS NEEDED
Status: DISCONTINUED | OUTPATIENT
Start: 2019-09-04 | End: 2019-09-04 | Stop reason: HOSPADM

## 2019-09-04 RX ORDER — INSULIN LISPRO 100 [IU]/ML
INJECTION, SOLUTION INTRAVENOUS; SUBCUTANEOUS ONCE
Status: DISCONTINUED | OUTPATIENT
Start: 2019-09-04 | End: 2019-09-04 | Stop reason: HOSPADM

## 2019-09-04 RX ORDER — SODIUM CHLORIDE, SODIUM LACTATE, POTASSIUM CHLORIDE, CALCIUM CHLORIDE 600; 310; 30; 20 MG/100ML; MG/100ML; MG/100ML; MG/100ML
50 INJECTION, SOLUTION INTRAVENOUS CONTINUOUS
Status: DISCONTINUED | OUTPATIENT
Start: 2019-09-04 | End: 2019-09-04

## 2019-09-04 RX ORDER — LIDOCAINE HYDROCHLORIDE 20 MG/ML
INJECTION, SOLUTION EPIDURAL; INFILTRATION; INTRACAUDAL; PERINEURAL AS NEEDED
Status: DISCONTINUED | OUTPATIENT
Start: 2019-09-04 | End: 2019-09-04 | Stop reason: HOSPADM

## 2019-09-04 RX ORDER — HYDROMORPHONE HYDROCHLORIDE 2 MG/ML
0.5 INJECTION, SOLUTION INTRAMUSCULAR; INTRAVENOUS; SUBCUTANEOUS
Status: DISCONTINUED | OUTPATIENT
Start: 2019-09-04 | End: 2019-09-04

## 2019-09-04 RX ORDER — OXYCODONE AND ACETAMINOPHEN 5; 325 MG/1; MG/1
1 TABLET ORAL
Status: DISCONTINUED | OUTPATIENT
Start: 2019-09-04 | End: 2019-09-12 | Stop reason: HOSPADM

## 2019-09-04 RX ORDER — BUDESONIDE 0.5 MG/2ML
500 INHALANT ORAL
Status: DISCONTINUED | OUTPATIENT
Start: 2019-09-04 | End: 2019-09-12 | Stop reason: HOSPADM

## 2019-09-04 RX ORDER — FENTANYL CITRATE 50 UG/ML
50 INJECTION, SOLUTION INTRAMUSCULAR; INTRAVENOUS AS NEEDED
Status: DISCONTINUED | OUTPATIENT
Start: 2019-09-04 | End: 2019-09-04

## 2019-09-04 RX ORDER — ONDANSETRON HYDROCHLORIDE 4 MG/2ML
INJECTION, SOLUTION INTRAMUSCULAR; INTRAVENOUS AS NEEDED
Status: DISCONTINUED | OUTPATIENT
Start: 2019-09-04 | End: 2019-09-04 | Stop reason: HOSPADM

## 2019-09-04 RX ORDER — LIDOCAINE HYDROCHLORIDE AND EPINEPHRINE 5; 5 MG/ML; UG/ML
INJECTION, SOLUTION INFILTRATION; PERINEURAL AS NEEDED
Status: DISCONTINUED | OUTPATIENT
Start: 2019-09-04 | End: 2019-09-04 | Stop reason: HOSPADM

## 2019-09-04 RX ORDER — SODIUM CHLORIDE 0.9 % (FLUSH) 0.9 %
5-40 SYRINGE (ML) INJECTION EVERY 8 HOURS
Status: DISCONTINUED | OUTPATIENT
Start: 2019-09-04 | End: 2019-09-04

## 2019-09-04 RX ORDER — MORPHINE SULFATE 2 MG/ML
2 INJECTION, SOLUTION INTRAMUSCULAR; INTRAVENOUS
Status: DISCONTINUED | OUTPATIENT
Start: 2019-09-04 | End: 2019-09-12 | Stop reason: HOSPADM

## 2019-09-04 RX ORDER — FENTANYL CITRATE 50 UG/ML
INJECTION, SOLUTION INTRAMUSCULAR; INTRAVENOUS AS NEEDED
Status: DISCONTINUED | OUTPATIENT
Start: 2019-09-04 | End: 2019-09-04 | Stop reason: HOSPADM

## 2019-09-04 RX ORDER — FLUTICASONE PROPIONATE 110 UG/1
1 AEROSOL, METERED RESPIRATORY (INHALATION) EVERY 12 HOURS
Status: DISCONTINUED | OUTPATIENT
Start: 2019-09-04 | End: 2019-09-04 | Stop reason: CLARIF

## 2019-09-04 RX ORDER — SODIUM CHLORIDE 0.9 % (FLUSH) 0.9 %
5-40 SYRINGE (ML) INJECTION AS NEEDED
Status: DISCONTINUED | OUTPATIENT
Start: 2019-09-04 | End: 2019-09-04

## 2019-09-04 RX ORDER — SODIUM CHLORIDE 0.9 % (FLUSH) 0.9 %
5-40 SYRINGE (ML) INJECTION EVERY 8 HOURS
Status: DISCONTINUED | OUTPATIENT
Start: 2019-09-04 | End: 2019-09-12 | Stop reason: HOSPADM

## 2019-09-04 RX ORDER — ACYCLOVIR 200 MG/1
400 CAPSULE ORAL
Status: DISCONTINUED | OUTPATIENT
Start: 2019-09-04 | End: 2019-09-12 | Stop reason: HOSPADM

## 2019-09-04 RX ORDER — SODIUM CHLORIDE, SODIUM LACTATE, POTASSIUM CHLORIDE, CALCIUM CHLORIDE 600; 310; 30; 20 MG/100ML; MG/100ML; MG/100ML; MG/100ML
100 INJECTION, SOLUTION INTRAVENOUS CONTINUOUS
Status: DISCONTINUED | OUTPATIENT
Start: 2019-09-04 | End: 2019-09-12 | Stop reason: HOSPADM

## 2019-09-04 RX ORDER — FAMOTIDINE 20 MG/1
20 TABLET, FILM COATED ORAL ONCE
Status: COMPLETED | OUTPATIENT
Start: 2019-09-04 | End: 2019-09-04

## 2019-09-04 RX ORDER — ISOSULFAN BLUE 50 MG/5ML
INJECTION, SOLUTION SUBCUTANEOUS AS NEEDED
Status: DISCONTINUED | OUTPATIENT
Start: 2019-09-04 | End: 2019-09-04 | Stop reason: HOSPADM

## 2019-09-04 RX ORDER — SODIUM CHLORIDE 0.9 % (FLUSH) 0.9 %
5-40 SYRINGE (ML) INJECTION AS NEEDED
Status: DISCONTINUED | OUTPATIENT
Start: 2019-09-04 | End: 2019-09-12 | Stop reason: HOSPADM

## 2019-09-04 RX ORDER — ONDANSETRON 2 MG/ML
4 INJECTION INTRAMUSCULAR; INTRAVENOUS ONCE
Status: COMPLETED | OUTPATIENT
Start: 2019-09-04 | End: 2019-09-04

## 2019-09-04 RX ORDER — MIDAZOLAM HYDROCHLORIDE 1 MG/ML
INJECTION, SOLUTION INTRAMUSCULAR; INTRAVENOUS AS NEEDED
Status: DISCONTINUED | OUTPATIENT
Start: 2019-09-04 | End: 2019-09-04 | Stop reason: HOSPADM

## 2019-09-04 RX ORDER — PROPOFOL 10 MG/ML
INJECTION, EMULSION INTRAVENOUS AS NEEDED
Status: DISCONTINUED | OUTPATIENT
Start: 2019-09-04 | End: 2019-09-04 | Stop reason: HOSPADM

## 2019-09-04 RX ORDER — SODIUM CHLORIDE, SODIUM LACTATE, POTASSIUM CHLORIDE, CALCIUM CHLORIDE 600; 310; 30; 20 MG/100ML; MG/100ML; MG/100ML; MG/100ML
50 INJECTION, SOLUTION INTRAVENOUS CONTINUOUS
Status: DISCONTINUED | OUTPATIENT
Start: 2019-09-04 | End: 2019-09-04 | Stop reason: HOSPADM

## 2019-09-04 RX ORDER — CEFAZOLIN SODIUM 2 G/50ML
2 SOLUTION INTRAVENOUS
Status: COMPLETED | OUTPATIENT
Start: 2019-09-04 | End: 2019-09-04

## 2019-09-04 RX ORDER — PRAVASTATIN SODIUM 20 MG/1
40 TABLET ORAL
Status: DISCONTINUED | OUTPATIENT
Start: 2019-09-04 | End: 2019-09-12 | Stop reason: HOSPADM

## 2019-09-04 RX ORDER — ACETAMINOPHEN 325 MG/1
650 TABLET ORAL
Status: DISCONTINUED | OUTPATIENT
Start: 2019-09-04 | End: 2019-09-12 | Stop reason: HOSPADM

## 2019-09-04 RX ADMIN — DEXAMETHASONE SODIUM PHOSPHATE 4 MG: 4 INJECTION, SOLUTION INTRA-ARTICULAR; INTRALESIONAL; INTRAMUSCULAR; INTRAVENOUS; SOFT TISSUE at 09:02

## 2019-09-04 RX ADMIN — PRAVASTATIN SODIUM 40 MG: 20 TABLET ORAL at 23:45

## 2019-09-04 RX ADMIN — ONDANSETRON HYDROCHLORIDE 4 MG: 4 INJECTION, SOLUTION INTRAMUSCULAR; INTRAVENOUS at 10:01

## 2019-09-04 RX ADMIN — CEFAZOLIN SODIUM 2 G: 2 SOLUTION INTRAVENOUS at 08:52

## 2019-09-04 RX ADMIN — HYDROMORPHONE HYDROCHLORIDE 0.5 MG: 1 INJECTION, SOLUTION INTRAMUSCULAR; INTRAVENOUS; SUBCUTANEOUS at 11:20

## 2019-09-04 RX ADMIN — SODIUM CHLORIDE, SODIUM LACTATE, POTASSIUM CHLORIDE, AND CALCIUM CHLORIDE 100 ML/HR: 600; 310; 30; 20 INJECTION, SOLUTION INTRAVENOUS at 21:32

## 2019-09-04 RX ADMIN — FAMOTIDINE 20 MG: 20 TABLET ORAL at 07:51

## 2019-09-04 RX ADMIN — BUDESONIDE 500 MCG: 0.5 INHALANT RESPIRATORY (INHALATION) at 19:22

## 2019-09-04 RX ADMIN — HYDROMORPHONE HYDROCHLORIDE 0.5 MG: 1 INJECTION, SOLUTION INTRAMUSCULAR; INTRAVENOUS; SUBCUTANEOUS at 10:58

## 2019-09-04 RX ADMIN — SODIUM CHLORIDE, SODIUM LACTATE, POTASSIUM CHLORIDE, AND CALCIUM CHLORIDE 100 ML/HR: 600; 310; 30; 20 INJECTION, SOLUTION INTRAVENOUS at 12:55

## 2019-09-04 RX ADMIN — PROPOFOL 170 MG: 10 INJECTION, EMULSION INTRAVENOUS at 08:55

## 2019-09-04 RX ADMIN — HYDROMORPHONE HYDROCHLORIDE 0.5 MG: 1 INJECTION, SOLUTION INTRAMUSCULAR; INTRAVENOUS; SUBCUTANEOUS at 10:26

## 2019-09-04 RX ADMIN — MIDAZOLAM HYDROCHLORIDE 2 MG: 1 INJECTION, SOLUTION INTRAMUSCULAR; INTRAVENOUS at 08:48

## 2019-09-04 RX ADMIN — FENTANYL CITRATE 100 MCG: 50 INJECTION, SOLUTION INTRAMUSCULAR; INTRAVENOUS at 08:55

## 2019-09-04 RX ADMIN — ACYCLOVIR 400 MG: 200 CAPSULE ORAL at 18:35

## 2019-09-04 RX ADMIN — SODIUM CHLORIDE, SODIUM LACTATE, POTASSIUM CHLORIDE, AND CALCIUM CHLORIDE 50 ML/HR: 600; 310; 30; 20 INJECTION, SOLUTION INTRAVENOUS at 07:52

## 2019-09-04 RX ADMIN — LIDOCAINE HYDROCHLORIDE 80 MG: 20 INJECTION, SOLUTION EPIDURAL; INFILTRATION; INTRACAUDAL; PERINEURAL at 08:55

## 2019-09-04 RX ADMIN — ACYCLOVIR 400 MG: 200 CAPSULE ORAL at 23:45

## 2019-09-04 RX ADMIN — Medication 10 ML: at 18:36

## 2019-09-04 RX ADMIN — OXYCODONE HYDROCHLORIDE AND ACETAMINOPHEN 1 TABLET: 5; 325 TABLET ORAL at 12:56

## 2019-09-04 RX ADMIN — ONDANSETRON 4 MG: 2 INJECTION INTRAMUSCULAR; INTRAVENOUS at 12:17

## 2019-09-04 NOTE — ANESTHESIA PREPROCEDURE EVALUATION
Relevant Problems   No relevant active problems       Anesthetic History   No history of anesthetic complications            Review of Systems / Medical History  Patient summary reviewed and pertinent labs reviewed    Pulmonary        Sleep apnea: No treatment           Neuro/Psych   Within defined limits           Cardiovascular            Dysrhythmias : atrial fibrillation      Exercise tolerance: >4 METS     GI/Hepatic/Renal                Endo/Other        Arthritis     Other Findings              Physical Exam    Airway  Mallampati: II  TM Distance: 4 - 6 cm  Neck ROM: normal range of motion   Mouth opening: Normal     Cardiovascular    Rhythm: irregular           Dental  No notable dental hx       Pulmonary  Breath sounds clear to auscultation               Abdominal  GI exam deferred       Other Findings            Anesthetic Plan    ASA: 3  Anesthesia type: general          Induction: Intravenous  Anesthetic plan and risks discussed with: Patient

## 2019-09-04 NOTE — BRIEF OP NOTE
BRIEF OPERATIVE NOTE    Date of Procedure: 9/4/2019   Preoperative Diagnosis: Left lower leg sin loss after trauma and cellulitis   Postoperative Diagnosis: as above    Procedure(s):  DEBRIDEMENT AND SKIN GRAFTING OF LEFT LOWER LEG OPEN  WOUND measuring 11 x 6.5 cm - with full thickness skin grafting and 200 cm sq split thickness skin graft for donor site - short leg POP splint    Surgeon(s) and Role:     Yane Monterroso MD - Primary         Surgical Assistant: Hazel Sorensen  Surgical Staff:  Circ-1: Timo Berkowitz RN  Scrub Tech-1: Melchor Hernadez  Surg Asst-1: Staci Malloy  Surg Asst-2:  Luci Richter  Event Time In Time Out   Incision Start 0913    Incision Close 1006      Anesthesia: General   Estimated Blood Loss: minimal  Specimens:   ID Type Source Tests Collected by Time Destination   1 : DEBRIDED TISSUE LEFT LOWER LEG, SOFT TISSUE DEFECT Preservative   Yane Monterroso MD 9/4/2019 1029 Pathology      Findings: No infection and good vascularity of bed   Complications : none

## 2019-09-04 NOTE — PERIOP NOTES
Pre-Op Summary    Pt arrived via car with family/friend and is oriented to time, place, person and situation. Patient with steady gait with none assistive devices. Visit Vitals  BP (!) 154/93 (BP 1 Location: Left arm, BP Patient Position: At rest)   Pulse 71   Temp 98.4 °F (36.9 °C)   Resp 16   Ht 6' 1\" (1.854 m)   Wt 95.3 kg (210 lb)   SpO2 98%   BMI 27.71 kg/m²       Peripheral IV located on Right hand . Patients belongings are located with [facility]. Patient's point of contact is [facility] and their contact number is: [phone]. They will be in the waiting room. They are able to receive medication information. They will be their ride home.

## 2019-09-04 NOTE — MANAGEMENT PLAN
Discharge/Transition Planning    Problem: Discharge Planning  Goal: *Discharge to safe environment  Outcome: Progressing Towards Goal   Home    Reason for Admission:                      RRAT Score:        12             Plan for utilizing home health:     Used [facility] and is okay with this choice if needs again                      Current Advanced Directive/Advance Care Plan: On file                         Transition of Care Plan:       Interviewed patient. Verified demographics listed on face sheet with patient; all information correct. Pt has Medicare, [employer] and [employer] Patient stated their PCP is Dr Wilfredo Rodriguez and sees on regular appts. Patient lives in [facility] with sig other. Patient's NOK is sig other. Patient independent with ADLs prior to admission. No use of DME prior to admission. Discharge plan is Home      Patient has designated __sig other______________________ to participate in his/her discharge plan and to receive any needed information. Barbi Alejandre Life [employer] [phone]        Care Management Interventions  PCP Verified by CM: Yes(Dr Carmichael)  Mode of Transport at Discharge:  Other (see comment)(sig other)  Transition of Care Consult (CM Consult): Discharge Planning  Current Support Network: Own Home(with sig other)  Confirm Follow Up Transport: Self  Plan discussed with Pt/Family/Caregiver: Yes  Discharge Location  Discharge Placement: Home

## 2019-09-04 NOTE — PERIOP NOTES
1024. Arrived to [facility]. Monitors attached. VSS. Disoriented x3.     1030- Patient having flashbacks, crying and still disoriented x3.     11:20- Alert &Oriented x4.    12.35- TRANSFER - OUT REPORT:    Verbal report given to Hussein Arceo RN on Gurdeep  being transferred to 2200(unit) for routine progression of care       Report consisted of patients Situation, Background, Assessment and   Recommendations(SBAR). Information from the following report(s) SBAR, Procedure Summary, Intake/Output, MAR and Recent Results was reviewed with the receiving nurse. Lines:   Peripheral IV 09/04/19 Left Wrist (Active)   Site Assessment Clean, dry, & intact 9/4/2019 10:24 AM   Phlebitis Assessment 0 9/4/2019 10:24 AM   Infiltration Assessment 0 9/4/2019 10:24 AM   Dressing Status Clean, dry, & intact 9/4/2019 10:24 AM   Dressing Type Tape;Transparent 9/4/2019 10:24 AM   Hub Color/Line Status Pink; Infusing 9/4/2019 10:24 AM        Opportunity for questions and clarification was provided.       Patient transported with:   Registered Nurse  Tech

## 2019-09-04 NOTE — H&P
Date of Surgery Update:  Samy Lopez was seen and examined. Patient,s history reviewed and patient re-examined without finding any significant changes. .    Signed By: Cristal Kramer MD     September 4, 2019 7:37 AM

## 2019-09-04 NOTE — PROGRESS NOTES
conducted an initial consultation and Spiritual Assessment for Tang Maxwell, who is a 71 y.o.,male. Patients Primary Language is: [place]. According to the patients EMR Jainism Affiliation is: [facility].     The reason the Patient came to the hospital is:   Patient Active Problem List    Diagnosis Date Noted    Status post full thickness skin graft 09/04/2019    Traumatic leg injury, left, initial encounter 09/04/2019    Cellulitis 09/04/2019    Trauma 09/04/2019    Wound cellulitis 07/29/2019    A-fib ([address] 07/29/2019    KARLO (obstructive sleep apnea) 07/29/2019        The  provided the following Interventions:  Initiated a relationship of care and support. Provided information about Spiritual Care Services. Offered prayer and assurance of continued prayers on patient's behalf. The following outcomes were achieved:  Patient expressed gratitude for 's visit. Assessment:  There are no further spiritual or Jew issues which require intervention at this time. Plan:  Chaplains will continue to follow and will provide pastoral care on an as needed/requested basis. Apryl Vargas M.Div.   , [address] [facility]: [phone]/LRF: [phone]

## 2019-09-04 NOTE — ANESTHESIA POSTPROCEDURE EVALUATION
Procedure(s):  DEBRIDEMENT AND SKIN GRAFTING OF LEFT LOWER LEG WOUND.    general    Anesthesia Post Evaluation      Multimodal analgesia: multimodal analgesia used between 6 hours prior to anesthesia start to PACU discharge  Patient location during evaluation: bedside  Patient participation: complete - patient participated  Level of consciousness: awake  Pain score: 3  Pain management: adequate  Airway patency: patent  Anesthetic complications: no  Cardiovascular status: stable  Respiratory status: acceptable  Hydration status: acceptable  Post anesthesia nausea and vomiting:  controlled      Vitals Value Taken Time   /90 9/4/2019 10:34 AM   Temp 36.3 °C (97.4 °F) 9/4/2019 10:24 AM   Pulse 64 9/4/2019 10:36 AM   Resp 13 9/4/2019 10:36 AM   SpO2 100 % 9/4/2019 10:36 AM   Vitals shown include unvalidated device data.

## 2019-09-05 ENCOUNTER — HOME CARE VISIT (OUTPATIENT)
Dept: HOME HEALTH SERVICES | Facility: HOME HEALTH | Age: 69
End: 2019-09-05
Payer: MEDICARE

## 2019-09-05 LAB
HCT VFR BLD AUTO: 36.6 % (ref 36–48)
HGB BLD-MCNC: 12.2 G/DL (ref 13–16)

## 2019-09-05 PROCEDURE — 65270000029 HC RM PRIVATE

## 2019-09-05 PROCEDURE — 3331090001 HH PPS REVENUE CREDIT

## 2019-09-05 PROCEDURE — 94640 AIRWAY INHALATION TREATMENT: CPT

## 2019-09-05 PROCEDURE — 74011000250 HC RX REV CODE- 250: Performed by: PLASTIC SURGERY

## 2019-09-05 PROCEDURE — 3331090002 HH PPS REVENUE DEBIT

## 2019-09-05 PROCEDURE — 36415 COLL VENOUS BLD VENIPUNCTURE: CPT

## 2019-09-05 PROCEDURE — 74011250637 HC RX REV CODE- 250/637: Performed by: PLASTIC SURGERY

## 2019-09-05 PROCEDURE — 85018 HEMOGLOBIN: CPT

## 2019-09-05 PROCEDURE — 74011250636 HC RX REV CODE- 250/636: Performed by: PLASTIC SURGERY

## 2019-09-05 PROCEDURE — 94761 N-INVAS EAR/PLS OXIMETRY MLT: CPT

## 2019-09-05 RX ADMIN — PRAVASTATIN SODIUM 40 MG: 20 TABLET ORAL at 21:28

## 2019-09-05 RX ADMIN — SODIUM CHLORIDE, SODIUM LACTATE, POTASSIUM CHLORIDE, AND CALCIUM CHLORIDE 100 ML/HR: 600; 310; 30; 20 INJECTION, SOLUTION INTRAVENOUS at 05:40

## 2019-09-05 RX ADMIN — ACYCLOVIR 400 MG: 200 CAPSULE ORAL at 17:15

## 2019-09-05 RX ADMIN — ACYCLOVIR 400 MG: 200 CAPSULE ORAL at 21:28

## 2019-09-05 RX ADMIN — ACYCLOVIR 400 MG: 200 CAPSULE ORAL at 18:00

## 2019-09-05 RX ADMIN — BUDESONIDE 500 MCG: 0.5 INHALANT RESPIRATORY (INHALATION) at 08:26

## 2019-09-05 RX ADMIN — OXYCODONE HYDROCHLORIDE AND ACETAMINOPHEN 1 TABLET: 5; 325 TABLET ORAL at 09:00

## 2019-09-05 RX ADMIN — ACYCLOVIR 400 MG: 200 CAPSULE ORAL at 05:38

## 2019-09-05 RX ADMIN — BUDESONIDE 500 MCG: 0.5 INHALANT RESPIRATORY (INHALATION) at 20:14

## 2019-09-05 RX ADMIN — ACYCLOVIR 400 MG: 200 CAPSULE ORAL at 10:46

## 2019-09-05 NOTE — PROGRESS NOTES
Problem: Falls - Risk of  Goal: *Absence of Falls  Description  Document Boby Jiménez Fall Risk and appropriate interventions in the flowsheet. Outcome: Progressing Towards Goal  Note:   Fall Risk Interventions:  Mobility Interventions: Patient to call before getting OOB, PT Consult for mobility concerns, PT Consult for assist device competence    Mentation Interventions: Adequate sleep, hydration, pain control, Update white board    Medication Interventions: Patient to call before getting OOB    Elimination Interventions: Call light in reach, Urinal in reach, Patient to call for help with toileting needs    History of Falls Interventions: Door open when patient unattended         Problem: Patient Education: Go to Patient Education Activity  Goal: Patient/Family Education  Outcome: Progressing Towards Goal     Problem: Discharge Planning  Goal: *Discharge to safe environment  Outcome: Progressing Towards Goal     Problem: Pain  Goal: *Control of Pain  Outcome: Progressing Towards Goal     Problem: Patient Education: Go to Patient Education Activity  Goal: Patient/Family Education  Outcome: Progressing Towards Goal     Problem: Lower Extremity Wound Care  Goal: *Non-infected wound: Improvement of existing wound, absence of infection, and maintenance of skin integrity  Outcome: Progressing Towards Goal  Goal: *Infected Wound: Prevention of further infection and promotion of healing  Description  Infection control procedures (eg: clean dressings, clean gloves, hand washing, precautions to isolate wound from contamination, sterile instruments used for wound debridement) should be implemented.   Outcome: Progressing Towards Goal  Goal: Interventions  Outcome: Progressing Towards Goal

## 2019-09-05 NOTE — PROGRESS NOTES
Nutrition initial assessment/Plan of care      RECOMMENDATIONS:   1. Cardiac Diet   2. Monitor labs, weight and PO intake  3. RD to follow     GOALS:   1. PO intake meets >75% of protein/calorie needs by 9/12  2. Weight Maintenance (+/- 1-2 lb) by 9/12      ASSESSMENT:   Wt status is classified as overweight per Body mass index is 27.71 kg/m². Tolerating CL diet. Labs noted. Nutrition recommendations listed. RD to follow. Nutrition Diagnoses:   Increased nutrient needs related to wound healing as evidenced by recent [address] OF LEFT LOWER LEG OPEN WOUND. Nutrition Risk:  [] High  [] Moderate [x]  Low    SUBJECTIVE/OBJECTIVE:    Pt admitted for traumatic leg injury. Pt is s/p DEBRIDEMENT AND SKIN GRAFTING OF LEFT LOWER LEG OPEN  WOUND measuring 11 x 6.5 cm - with full thickness skin grafting and 200 cm sq split thickness skin graft for donor site - short leg POP splint on (9/4). Pt seen in room this afternoon talking on the phone. Pt is allergic to sweet potatoes and no problems chewing/swallowing. Reports having a good appetite and consuming 2 melas and a snack per day at home. Tolerating CL tray today with non N/V and menu in room to implement preferences. Diet orders for advance as tolerated to will change to cardiac diet. Discussed importance of viable protein source with each meal and provided handouts. Will continue to monitor.       Information Obtained from:    [x] Chart Review   [x] Patient   [] Family/Caregiver   [] Nurse/Physician   [] Interdisciplinary Meeting/Rounds      Diet: CL   Medications: [x] Reviewed    Allergies: [x] Reviewed   Patient Active Problem List   Diagnosis Code    Wound cellulitis L03.90    A-fib ([facility] [address] I48.91    KARLO (obstructive sleep apnea) G47.33    Status post full thickness skin graft Z94.5    Traumatic leg injury, left, initial encounter S89. 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     Sleep apnea       Labs:    Lab Results   Component Value Date/Time    Sodium 141 09/03/2019 07:17 AM    Potassium 4.1 09/03/2019 07:17 AM    Chloride 105 09/03/2019 07:17 AM    CO2 34 (H) 09/03/2019 07:17 AM    Anion gap 2 (L) 09/03/2019 07:17 AM    Glucose 101 (H) 09/03/2019 07:17 AM    BUN 18 09/03/2019 07:17 AM    Creatinine 0.98 09/03/2019 07:17 AM    Calcium 9.1 09/03/2019 07:17 AM    Magnesium 2.4 08/01/2019 01:30 AM    Phosphorus 3.8 08/01/2019 01:30 AM    Albumin 3.9 09/03/2019 07:17 AM     Anthropometrics: BMI (calculated): 27.7  Last 3 Recorded Weights in this Encounter    09/03/19 0738 09/04/19 0742   Weight: 95.3 kg (210 lb) 95.3 kg (210 lb)      Ht Readings from Last 1 Encounters:   09/04/19 6' 1\" (1.854 m)     No data found. IBW: 184 lb %IBW: 114% UBW: 210 lb   [] Weight Loss [] Weight Gain [x] Weight Stable    Estimated Nutrition Needs: [x] MSJ x 1.2-1.3  Calories: 9814-2783 kcal Based on:   [x] Actual BW    Protein:    g Based on:   [x] Actual BW    Fluid:       0473-5027 ml Based on:   [x] Actual BW      [x] No Cultural, Hinduism or ethnic dietary need identified.     [] Cultural, Hinduism and ethnic food preferences identified and addressed     Wt Status:  [] Normal (18.6 - 24.9) [] Underweight (<18.5) [x] Overweight (25 - 29.9) [] Mild Obesity (30 - 34.9)  [] Moderate Obesity (35 - 39.9) [] Morbid Obesity (40+)       Nutrition Problems Identified:   [] Suboptimal PO intake   [x] Food Allergies (sweet pot)  [] Difficulty chewing/swallowing/poor dentition  [] Constipation/Diarrhea   [] Nausea/Vomiting   [] None  [x] Other: Wound healing     Plan:   [x] Therapeutic Diet  [x]  Obtained/adjusted food preferences/tolerances and/or snacks options   []  Supplements added   [] Occupational therapy following for feeding techniques  []  HS snack added   []  Modify diet texture   [x]  Modify diet for food allergies [x]  Educate patient   []  Assist with menu selection   [x]  Monitor PO intake on meal rounds   [x]  Continue inpatient monitoring and intervention   [x]  Participated in discharge planning/Interdisciplinary rounds/Team meetings   []  Other:     Education Needs:   [] Not appropriate for teaching at this time due to:   [x] Identified and addressed    Nutrition Monitoring and Evaluation:  [x] Continue ongoing monitoring and intervention  [] Other    Case Alves

## 2019-09-05 NOTE — ROUTINE PROCESS
Bedside and Verbal shift change report given to [address] (oncoming nurse) by Yonatan Crowell RN (offgoing nurse). Report included the following information SBAR, OR Summary, Procedure Summary, Intake/Output, MAR, Recent Results and Med Rec Status.

## 2019-09-05 NOTE — PROGRESS NOTES
NUTRITION  Patient/Family Education Record    FACTORS THAT MAY INFLUENCE PATIENTS ABILITY TO LEARN:   []   Language barrier    []   Cultural needs   []   Motivation    []   Cognitive limitation    []   Physical   []   Education   []   Physiological factors   []   Hearing/vision/speaking impairment   []   Jain beliefs    []   Financial limitations    []  Other:   [x]   No barriers limiting ability to learn     Person Instructed:   [x]   Patient   []   Family   []  Other     Preference for Learning:   [x]   Verbal   [x]   Written   []  Demonstration     Patient educated on:   [] Cardiac/heart healthy diet  [] 2gm Sodium diet  [] Vitamin K regulated diet (coumadin)  [] Weight loss/portion control  [x] High protein  [] Other:    Goal:  Patient will demonstrate understanding of modified diet by implementing suggestions to promote wound healing    Outcome:   [x]  Patient verbalized understanding of education and willing to comply with recommendations.   []  Patient declined education  []  Patient needs follow up education; scheduled date for follow up:  [x]  Written information provided  [x]  RD contact information provided    Arlen Alegria

## 2019-09-05 NOTE — PROGRESS NOTES
Patient very comfortable and in no pain -  all dressings intact with no leakage - patient tolerating absolute and ESSENTIAL bedrest to ensure good take of graft - it will take SEVEN full days of absolute bedrest to safely reconstruct his left leg.

## 2019-09-06 VITALS
DIASTOLIC BLOOD PRESSURE: 70 MMHG | TEMPERATURE: 97.9 F | HEART RATE: 78 BPM | RESPIRATION RATE: 16 BRPM | SYSTOLIC BLOOD PRESSURE: 124 MMHG | OXYGEN SATURATION: 98 %

## 2019-09-06 PROCEDURE — 74011000250 HC RX REV CODE- 250: Performed by: PLASTIC SURGERY

## 2019-09-06 PROCEDURE — 3331090002 HH PPS REVENUE DEBIT

## 2019-09-06 PROCEDURE — 3331090001 HH PPS REVENUE CREDIT

## 2019-09-06 PROCEDURE — 94640 AIRWAY INHALATION TREATMENT: CPT

## 2019-09-06 PROCEDURE — 74011250636 HC RX REV CODE- 250/636: Performed by: PLASTIC SURGERY

## 2019-09-06 PROCEDURE — 65270000029 HC RM PRIVATE

## 2019-09-06 PROCEDURE — 74011250637 HC RX REV CODE- 250/637: Performed by: PLASTIC SURGERY

## 2019-09-06 PROCEDURE — 51798 US URINE CAPACITY MEASURE: CPT

## 2019-09-06 PROCEDURE — 94761 N-INVAS EAR/PLS OXIMETRY MLT: CPT

## 2019-09-06 RX ADMIN — SODIUM CHLORIDE, SODIUM LACTATE, POTASSIUM CHLORIDE, AND CALCIUM CHLORIDE 100 ML/HR: 600; 310; 30; 20 INJECTION, SOLUTION INTRAVENOUS at 02:13

## 2019-09-06 RX ADMIN — BUDESONIDE 500 MCG: 0.5 INHALANT RESPIRATORY (INHALATION) at 20:00

## 2019-09-06 RX ADMIN — Medication 10 ML: at 06:32

## 2019-09-06 RX ADMIN — SODIUM CHLORIDE, SODIUM LACTATE, POTASSIUM CHLORIDE, AND CALCIUM CHLORIDE 100 ML/HR: 600; 310; 30; 20 INJECTION, SOLUTION INTRAVENOUS at 12:18

## 2019-09-06 RX ADMIN — ACYCLOVIR 400 MG: 200 CAPSULE ORAL at 09:00

## 2019-09-06 RX ADMIN — Medication 10 ML: at 06:33

## 2019-09-06 RX ADMIN — ACYCLOVIR 400 MG: 200 CAPSULE ORAL at 21:40

## 2019-09-06 RX ADMIN — BUDESONIDE 500 MCG: 0.5 INHALANT RESPIRATORY (INHALATION) at 08:51

## 2019-09-06 RX ADMIN — PRAVASTATIN SODIUM 40 MG: 20 TABLET ORAL at 21:40

## 2019-09-06 RX ADMIN — SODIUM CHLORIDE, SODIUM LACTATE, POTASSIUM CHLORIDE, AND CALCIUM CHLORIDE 100 ML/HR: 600; 310; 30; 20 INJECTION, SOLUTION INTRAVENOUS at 21:30

## 2019-09-06 RX ADMIN — ACYCLOVIR 400 MG: 200 CAPSULE ORAL at 17:33

## 2019-09-06 RX ADMIN — Medication 10 ML: at 21:41

## 2019-09-06 RX ADMIN — ACYCLOVIR 400 MG: 200 CAPSULE ORAL at 06:31

## 2019-09-06 NOTE — PROGRESS NOTES
1900: received pt from off going nurse [address] [place] [address], pt resting in bed quietly side rails raised x3, bed in lowest position, pt is asymptomatic bradycardic. 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 2219: 3.55 sec pause. 0530: Pt had 18 beat V-TACH.    0700: Pt had 3 sec pause. 0700: Bedside and Verbal shift change report given to [address] [place] [address] (oncoming nurse) by Chester Leonard   (offgoing nurse). Report included the following information SBAR, Intake/Output, MAR, Cardiac Rhythm ZURI and Alarm Parameters .

## 2019-09-06 NOTE — PROGRESS NOTES
Problem: Falls - Risk of  Goal: *Absence of Falls  Description  Document Wendy Sawyer Fall Risk and appropriate interventions in the flowsheet. Outcome: Progressing Towards Goal  Note:   Fall Risk Interventions:  Mobility Interventions: Bed/chair exit alarm    Mentation Interventions: Adequate sleep, hydration, pain control    Medication Interventions: Bed/chair exit alarm    Elimination Interventions: Call light in reach    History of Falls Interventions: Bed/chair exit alarm         Problem: Patient Education: Go to Patient Education Activity  Goal: Patient/Family Education  Outcome: Progressing Towards Goal     Problem: Discharge Planning  Goal: *Discharge to safe environment  Outcome: Progressing Towards Goal     Problem: Pain  Goal: *Control of Pain  Outcome: Progressing Towards Goal     Problem: Patient Education: Go to Patient Education Activity  Goal: Patient/Family Education  Outcome: Progressing Towards Goal     Problem: Lower Extremity Wound Care  Goal: *Non-infected wound: Improvement of existing wound, absence of infection, and maintenance of skin integrity  Outcome: Progressing Towards Goal  Goal: *Infected Wound: Prevention of further infection and promotion of healing  Description  Infection control procedures (eg: clean dressings, clean gloves, hand washing, precautions to isolate wound from contamination, sterile instruments used for wound debridement) should be implemented. Outcome: Progressing Towards Goal  Goal: Interventions  Outcome: Progressing Towards Goal     Problem: Pressure Injury - Risk of  Goal: *Prevention of pressure injury  Description  Document Sonu Scale and appropriate interventions in the flowsheet. Outcome: Progressing Towards Goal  Note:   Pressure Injury Interventions:             Activity Interventions: Pressure redistribution bed/mattress(bed type)    Mobility Interventions: HOB 30 degrees or less    Nutrition Interventions: Document food/fluid/supplement intake    Friction and Shear Interventions: Lift sheet                Problem: Patient Education: Go to Patient Education Activity  Goal: Patient/Family Education  Outcome: Progressing Towards Goal     Problem: Altered nutrition  Goal: *Optimize nutritional status  Outcome: Progressing Towards Goal

## 2019-09-06 NOTE — PROGRESS NOTES
Patient stable and tolerating essential and absolute bedrest and elevation - dressings intact and no sign of drainage - previous discussion with Cardiology detailed restarting the anticoagulant on third post-operative day.  ..

## 2019-09-06 NOTE — PROGRESS NOTES
Problem: Discharge Planning  Goal: *Discharge to safe environment  Outcome: Progressing Towards Goal   Home    Plan remains home when ready.

## 2019-09-07 PROCEDURE — 3331090001 HH PPS REVENUE CREDIT

## 2019-09-07 PROCEDURE — 65270000029 HC RM PRIVATE

## 2019-09-07 PROCEDURE — 74011250637 HC RX REV CODE- 250/637: Performed by: PLASTIC SURGERY

## 2019-09-07 PROCEDURE — 94761 N-INVAS EAR/PLS OXIMETRY MLT: CPT

## 2019-09-07 PROCEDURE — 74011000250 HC RX REV CODE- 250: Performed by: PLASTIC SURGERY

## 2019-09-07 PROCEDURE — 94640 AIRWAY INHALATION TREATMENT: CPT

## 2019-09-07 PROCEDURE — 3331090002 HH PPS REVENUE DEBIT

## 2019-09-07 PROCEDURE — 74011250636 HC RX REV CODE- 250/636: Performed by: PLASTIC SURGERY

## 2019-09-07 RX ORDER — DABIGATRAN ETEXILATE 150 MG/1
150 CAPSULE ORAL EVERY 12 HOURS
Status: DISCONTINUED | OUTPATIENT
Start: 2019-09-07 | End: 2019-09-12 | Stop reason: HOSPADM

## 2019-09-07 RX ADMIN — SODIUM CHLORIDE, SODIUM LACTATE, POTASSIUM CHLORIDE, AND CALCIUM CHLORIDE 100 ML/HR: 600; 310; 30; 20 INJECTION, SOLUTION INTRAVENOUS at 18:35

## 2019-09-07 RX ADMIN — PRAVASTATIN SODIUM 40 MG: 20 TABLET ORAL at 22:26

## 2019-09-07 RX ADMIN — OXYCODONE HYDROCHLORIDE AND ACETAMINOPHEN 1 TABLET: 5; 325 TABLET ORAL at 08:18

## 2019-09-07 RX ADMIN — ACYCLOVIR 400 MG: 200 CAPSULE ORAL at 06:27

## 2019-09-07 RX ADMIN — DABIGATRAN ETEXILATE MESYLATE 150 MG: 150 CAPSULE ORAL at 22:41

## 2019-09-07 RX ADMIN — ACYCLOVIR 400 MG: 200 CAPSULE ORAL at 22:26

## 2019-09-07 RX ADMIN — Medication 10 ML: at 06:28

## 2019-09-07 RX ADMIN — BUDESONIDE 500 MCG: 0.5 INHALANT RESPIRATORY (INHALATION) at 20:59

## 2019-09-07 RX ADMIN — SODIUM CHLORIDE, SODIUM LACTATE, POTASSIUM CHLORIDE, AND CALCIUM CHLORIDE 100 ML/HR: 600; 310; 30; 20 INJECTION, SOLUTION INTRAVENOUS at 08:21

## 2019-09-07 RX ADMIN — Medication 10 ML: at 15:44

## 2019-09-07 RX ADMIN — DABIGATRAN ETEXILATE MESYLATE 150 MG: 150 CAPSULE ORAL at 15:40

## 2019-09-07 RX ADMIN — ACYCLOVIR 400 MG: 200 CAPSULE ORAL at 15:40

## 2019-09-07 RX ADMIN — BUDESONIDE 500 MCG: 0.5 INHALANT RESPIRATORY (INHALATION) at 08:08

## 2019-09-07 RX ADMIN — ACYCLOVIR 400 MG: 200 CAPSULE ORAL at 09:20

## 2019-09-07 RX ADMIN — ACYCLOVIR 400 MG: 200 CAPSULE ORAL at 19:00

## 2019-09-07 NOTE — PROGRESS NOTES
3105 Report given to this nurse Wes Haji by Leonides Doty to assume care of patient.    3665. Bedside and Verbal shift change report given to Brigitte Blandon (oncoming nurse) by this nurse Sera Nolasco (offgoing nurse). Report included the following information SBAR, Kardex and MAR.

## 2019-09-07 NOTE — PROGRESS NOTES
Respiratory Therapy Assessment Care Plan    Patient:  Milady Anne 71 y.o. male 9/7/2019 9:35 AM    Traumatic leg injury, left, initial encounter [S89.92XA]  Status post full thickness skin graft [Z94.5]  Cellulitis [L03.90]  Trauma [T14.90XA]      Chest X-RAY:   Results from Hospital Encounter encounter on 09/03/19   XR CHEST PA LAT    Impression IMPRESSION:    No active cardiopulmonary disease. Results from [place] encounter on 07/29/19   XR TIB/FIB LT    Impression IMPRESSION:  1. Large ulcerative skin lesion. There are some changes of cellulitis adjacent  to this. 2. No underlying bony abnormality. No plain film evidence of osteomyelitis. If  that remains a strong concern, MRI or bone scan on a routine basis could be  performed. 3. Knee replacement.               Vital Signs:   Visit Vitals  /86   Pulse (!) 49   Temp 97.4 °F (36.3 °C)   Resp 16   Ht 6' 1\" (1.854 m)   Wt 95.3 kg (210 lb)   SpO2 99%   BMI 27.71 kg/m²           Indications for treatment: MD order, hx of KARLO      Plan of care: Maintenance bronchodilator, CPAP QHS (home unit)        Goal: Home regimen CPAP

## 2019-09-07 NOTE — ROUTINE PROCESS
Bedside and Verbal shift change report given to Lennie Mathews, RN (oncoming nurse) by Pat Newman RN, BSN (offgoing nurse). Report given with SBAR, Kardex, Intake/Output, MAR and Recent Results.

## 2019-09-07 NOTE — PROGRESS NOTES
Patient patiently tolerating absolute bedrest - all dressings intact - Pradaxa restarted @150mg Q12H - patient re-assured. Bella Metz

## 2019-09-07 NOTE — PROGRESS NOTES
Bedside shift change report was given to me, Dipti Erwin RN  ( ICU/float night shift nurse), by Josesito Goodman RN ( 2 [facility] day shift nurse). Report included the following information:  SBAR, kardex, consultations, hemodynamic monitoring, intake/output, MAR, lab results, VS trends. Skin, neuro, respiratory status, has been dually noted and verified at the bedside with:  Josesito Goodman RN                                         2 Central Nurse's Note:    2000: Pt is awake, alert x 4. He is [place], but is able to clearly convey his needs and answer questions appropriately. Pt exhibits generalized weakness to LLE. Ace wrapped plaster posterior OCL splint is intact to LLE, toes are exposed, warm and pink with brisk cap refill. DP pulses are palpable if RN reaches slightly under the ace wrapped drsg. Pt has good sensation, denies n/t and wiggles toes appropriately. FTSG site to left anterior thigh is intact with old, breakthrough drainage, which was reinforced with tape by the surgeon. Pt remains on strict bedrest, with LLE elevated and NWB. Pt requires partial assist with ADLs. He is adept at self repositioning, and voids independently in the urinal.. Bed is locked and in lowest position, side rails up x 3, call bell and bedside table are within reach, exit alarm activated, SCD's on RLE. Will continue to monitor. Neurological: as noted in assessment   Cardiovascular:  Tele box 53, Controlled A fib/Sinus Bradycardia/brief intermittent < 3 second pause x 3. Pt asymptomatic  Pulmonary: breath sounds clear, diminished with saturations maintained at 97% on room air, pt uses ICS at bedside appropriately and independently. GI/: Abdomen is soft, non- distended with active bowel sounds, last BM noted 09/03/19. Pt voids independently in the urinal, pale yellow urine noted. IVF: IV maintenance fluid  Skin: skin is intact    2200: Pt tolerated his scheduled HS meds without incident.  Pt denies current c/o pain or significant discomfort at this time. 2323: HR 56, pt noted to have a 2.05 second pause. Immediate bedside assessment indicated pt to be asymptomatic and in no acute distress. He awakened quickly to verbal directive and denied c/o. Will continue to monitor. 0030: VSS, pt is afebrile and resting quietly after assist with tele electrode replacement. Call bell is within reach. 0044: HR 55, pt noted to have a 2.60 second pause. Immediate bedside assessment indicated pt to be asymptomatic and in no acute distress. He awakened quickly to verbal directive and denied c/o. Will continue to monitor. 0050: HR 54, pt noted to have a 2.57 second pause. Immediate bedside assessment indicated pt to be asymptomatic and in no acute distress. He awakened quickly to verbal directive and denied c/o. Will continue to monitor. 0430: Pt tolerated a partial bed bath and linen change. VSS, pt remains afebrile and in no acute distress. 0600: Pt tolerated scheduled Am meds. He is in no acute distress, and continues to deny c/o significant pain or discomfort at this time. 0730:  Bedside change of shift report given to: Prospre Lozano RN

## 2019-09-07 NOTE — PROGRESS NOTES
Patient received in bed awake. Patient A&O x4. No distress noted. Wife at bedside. Bed locked in low position. Frequently use items within reach. Call bell within reach and Patient verbalized understanding of use for assistance and needs.

## 2019-09-07 NOTE — PROGRESS NOTES
Problem: Falls - Risk of  Goal: *Absence of Falls  Description  Document Isabel Lanier Fall Risk and appropriate interventions in the flowsheet. Outcome: Progressing Towards Goal  Note:   Fall Risk Interventions: call bell within reach, exit alarm, side rails up x 2  Mobility Interventions: Bed/chair exit alarm, Strengthening exercises (ROM-active/passive), PT Consult for assist device competence, PT Consult for mobility concerns    Mentation Interventions: Adequate sleep, hydration, pain control, Bed/chair exit alarm, Door open when patient unattended, Evaluate medications/consider consulting pharmacy, Eyeglasses and hearing aids, Increase mobility, More frequent rounding, Reorient patient, Room close to nurse's station, Self-releasing belt, Toileting rounds, Update white board    Medication Interventions: Bed/chair exit alarm, Patient to call before getting OOB, Teach patient to arise slowly    Elimination Interventions: Call light in reach, Toileting schedule/hourly rounds, Toilet paper/wipes in reach    History of Falls Interventions: Bed/chair exit alarm, Investigate reason for fall, Evaluate medications/consider consulting pharmacy, Room close to nurse's station, Assess for delayed presentation/identification of injury for 48 hrs (comment for end date)         Problem: Lower Extremity Wound Care  Goal: *Non-infected wound: Improvement of existing wound, absence of infection, and maintenance of skin integrity  Outcome: Progressing Towards Goal  Goal: *Infected Wound: Prevention of further infection and promotion of healing  Description  Infection control procedures (eg: clean dressings, clean gloves, hand washing, precautions to isolate wound from contamination, sterile instruments used for wound debridement) should be implemented.   Outcome: Progressing Towards Goal  Goal: Interventions  Outcome: Progressing Towards Goal     Problem: Pressure Injury - Risk of  Goal: *Prevention of pressure injury  Description  Document Sonu Scale and appropriate interventions in the flowsheet. Outcome: Progressing Towards Goal  Note:   Pressure Injury Interventions: adequate hydration, frequent repositioning, use pillows and wedges            Activity Interventions: Pressure redistribution bed/mattress(bed type), PT/OT evaluation    Mobility Interventions: HOB 30 degrees or less, Pressure redistribution bed/mattress (bed type), Turn and reposition approx.  every two hours(pillow and wedges)    Nutrition Interventions: Document food/fluid/supplement intake, Discuss nutritional consult with provider, Offer support with meals,snacks and hydration    Friction and Shear Interventions: HOB 30 degrees or less, Minimize layers, Transferring/repositioning devices                Problem: Patient Education: Go to Patient Education Activity  Goal: Patient/Family Education  Outcome: Progressing Towards Goal

## 2019-09-08 PROCEDURE — 65270000029 HC RM PRIVATE

## 2019-09-08 PROCEDURE — 94761 N-INVAS EAR/PLS OXIMETRY MLT: CPT

## 2019-09-08 PROCEDURE — 74011250637 HC RX REV CODE- 250/637: Performed by: PLASTIC SURGERY

## 2019-09-08 PROCEDURE — 74011250636 HC RX REV CODE- 250/636: Performed by: PLASTIC SURGERY

## 2019-09-08 PROCEDURE — 74011000250 HC RX REV CODE- 250: Performed by: PLASTIC SURGERY

## 2019-09-08 PROCEDURE — 3331090002 HH PPS REVENUE DEBIT

## 2019-09-08 PROCEDURE — 3331090001 HH PPS REVENUE CREDIT

## 2019-09-08 PROCEDURE — 94640 AIRWAY INHALATION TREATMENT: CPT

## 2019-09-08 RX ADMIN — ACYCLOVIR 400 MG: 200 CAPSULE ORAL at 21:27

## 2019-09-08 RX ADMIN — Medication 10 ML: at 14:00

## 2019-09-08 RX ADMIN — SODIUM CHLORIDE, SODIUM LACTATE, POTASSIUM CHLORIDE, AND CALCIUM CHLORIDE 100 ML/HR: 600; 310; 30; 20 INJECTION, SOLUTION INTRAVENOUS at 13:57

## 2019-09-08 RX ADMIN — DABIGATRAN ETEXILATE MESYLATE 150 MG: 150 CAPSULE ORAL at 08:40

## 2019-09-08 RX ADMIN — ACYCLOVIR 400 MG: 200 CAPSULE ORAL at 05:37

## 2019-09-08 RX ADMIN — BUDESONIDE 500 MCG: 0.5 INHALANT RESPIRATORY (INHALATION) at 20:10

## 2019-09-08 RX ADMIN — SODIUM CHLORIDE, SODIUM LACTATE, POTASSIUM CHLORIDE, AND CALCIUM CHLORIDE 100 ML/HR: 600; 310; 30; 20 INJECTION, SOLUTION INTRAVENOUS at 04:17

## 2019-09-08 RX ADMIN — ACETAMINOPHEN 650 MG: 325 TABLET, FILM COATED ORAL at 21:54

## 2019-09-08 RX ADMIN — SODIUM CHLORIDE, SODIUM LACTATE, POTASSIUM CHLORIDE, AND CALCIUM CHLORIDE 100 ML/HR: 600; 310; 30; 20 INJECTION, SOLUTION INTRAVENOUS at 23:54

## 2019-09-08 RX ADMIN — BUDESONIDE 500 MCG: 0.5 INHALANT RESPIRATORY (INHALATION) at 10:18

## 2019-09-08 RX ADMIN — PRAVASTATIN SODIUM 40 MG: 20 TABLET ORAL at 21:54

## 2019-09-08 RX ADMIN — ACYCLOVIR 400 MG: 200 CAPSULE ORAL at 14:00

## 2019-09-08 RX ADMIN — DABIGATRAN ETEXILATE MESYLATE 150 MG: 150 CAPSULE ORAL at 21:54

## 2019-09-08 NOTE — PROGRESS NOTES
Respiratory Therapy Assessment Care Plan    Patient:  Miya Robb 71 y.o. male 9/8/2019 3:56 PM    Traumatic leg injury, left, initial encounter [S89.92XA]  Status post full thickness skin graft [Z94.5]  Cellulitis [L03.90]  Trauma [T14.90XA]      Chest X-RAY:   Results from Hospital Encounter encounter on 09/03/19   XR CHEST PA LAT    Impression IMPRESSION:    No active cardiopulmonary disease. Results from [place] encounter on 07/29/19   XR TIB/FIB LT    Impression IMPRESSION:  1. Large ulcerative skin lesion. There are some changes of cellulitis adjacent  to this. 2. No underlying bony abnormality. No plain film evidence of osteomyelitis. If  that remains a strong concern, MRI or bone scan on a routine basis could be  performed. 3. Knee replacement.               Vital Signs:   Visit Vitals  /83 (BP 1 Location: Left arm, BP Patient Position: At rest)   Pulse 95   Temp 97.6 °F (36.4 °C)   Resp 18   Ht 6' 1\" (1.854 m)   Wt 95.3 kg (210 lb)   SpO2 94%   BMI 27.71 kg/m²           Indications for treatment: MD order, hx of KARLO        Plan of care: Maintenance bronchodilator, CPAP QHS (home unit)           Goal: Home regimen CPAP

## 2019-09-08 NOTE — PROGRESS NOTES
Problem: Falls - Risk of  Goal: *Absence of Falls  Description  Document Connie Norris Fall Risk and appropriate interventions in the flowsheet. Outcome: Progressing Towards Goal  Note:   Fall Risk Interventions:  Mobility Interventions: Patient to call before getting OOB    Mentation Interventions: Door open when patient unattended, More frequent rounding    Medication Interventions: Evaluate medications/consider consulting pharmacy, Patient to call before getting OOB    Elimination Interventions: Elevated toilet seat, Call light in reach, Patient to call for help with toileting needs    History of Falls Interventions: Door open when patient unattended         Problem: Patient Education: Go to Patient Education Activity  Goal: Patient/Family Education  Outcome: Progressing Towards Goal     Problem: Discharge Planning  Goal: *Discharge to safe environment  Outcome: Progressing Towards Goal     Problem: Pain  Goal: *Control of Pain  Outcome: Progressing Towards Goal     Problem: Patient Education: Go to Patient Education Activity  Goal: Patient/Family Education  Outcome: Progressing Towards Goal     Problem: Lower Extremity Wound Care  Goal: *Non-infected wound: Improvement of existing wound, absence of infection, and maintenance of skin integrity  Outcome: Progressing Towards Goal  Goal: *Infected Wound: Prevention of further infection and promotion of healing  Description  Infection control procedures (eg: clean dressings, clean gloves, hand washing, precautions to isolate wound from contamination, sterile instruments used for wound debridement) should be implemented. Outcome: Progressing Towards Goal  Goal: Interventions  Outcome: Progressing Towards Goal     Problem: Pressure Injury - Risk of  Goal: *Prevention of pressure injury  Description  Document Sonu Scale and appropriate interventions in the flowsheet. Outcome: Progressing Towards Goal  Note:   Pressure Injury Interventions:             Activity Interventions: Pressure redistribution bed/mattress(bed type), Increase time out of bed    Mobility Interventions: HOB 30 degrees or less, Pressure redistribution bed/mattress (bed type)    Nutrition Interventions: Document food/fluid/supplement intake    Friction and Shear Interventions: HOB 30 degrees or less                Problem: Patient Education: Go to Patient Education Activity  Goal: Patient/Family Education  Outcome: Progressing Towards Goal     Problem: Altered nutrition  Goal: *Optimize nutritional status  Outcome: Progressing Towards Goal

## 2019-09-08 NOTE — PROGRESS NOTES
Problem: Falls - Risk of  Goal: *Absence of Falls  Description  Document Yo Fells Fall Risk and appropriate interventions in the flowsheet. Outcome: Progressing Towards Goal  Note:   Fall Risk Interventions:  Mobility Interventions: Patient to call before getting OOB    Mentation Interventions: Door open when patient unattended, More frequent rounding    Medication Interventions: Evaluate medications/consider consulting pharmacy, Patient to call before getting OOB    Elimination Interventions: Elevated toilet seat, Call light in reach, Patient to call for help with toileting needs    History of Falls Interventions: Door open when patient unattended         Problem: Patient Education: Go to Patient Education Activity  Goal: Patient/Family Education  Outcome: Progressing Towards Goal     Problem: Discharge Planning  Goal: *Discharge to safe environment  Outcome: Progressing Towards Goal     Problem: Pain  Goal: *Control of Pain  Outcome: Progressing Towards Goal     Problem: Patient Education: Go to Patient Education Activity  Goal: Patient/Family Education  Outcome: Progressing Towards Goal     Problem: Lower Extremity Wound Care  Goal: *Non-infected wound: Improvement of existing wound, absence of infection, and maintenance of skin integrity  Outcome: Progressing Towards Goal  Goal: *Infected Wound: Prevention of further infection and promotion of healing  Description  Infection control procedures (eg: clean dressings, clean gloves, hand washing, precautions to isolate wound from contamination, sterile instruments used for wound debridement) should be implemented. Outcome: Progressing Towards Goal  Goal: Interventions  Outcome: Progressing Towards Goal     Problem: Pressure Injury - Risk of  Goal: *Prevention of pressure injury  Description  Document Sonu Scale and appropriate interventions in the flowsheet. Outcome: Progressing Towards Goal  Note:   Pressure Injury Interventions:             Activity Interventions: Pressure redistribution bed/mattress(bed type), Increase time out of bed    Mobility Interventions: HOB 30 degrees or less, Pressure redistribution bed/mattress (bed type)    Nutrition Interventions: Document food/fluid/supplement intake    Friction and Shear Interventions: HOB 30 degrees or less                Problem: Patient Education: Go to Patient Education Activity  Goal: Patient/Family Education  Outcome: Progressing Towards Goal     Problem: Altered nutrition  Goal: *Optimize nutritional status  Outcome: Progressing Towards Goal

## 2019-09-08 NOTE — PROGRESS NOTES
Patient has had no complications from the protective Pradaxa anticoagulant - maintaining the essential complete bedrest and constant elevation of the left reconstructed leg - all other parameters stable with first dressing change planned for Tuesday. Nina Titus

## 2019-09-08 NOTE — PROGRESS NOTES
Bedside and Verbal shift change report given to Andrew Rai RN (oncoming nurse) by Aysha Babin RN (offgoing nurse). Report included the following information SBAR, Kardex, Procedure Summary, Intake/Output and MAR.

## 2019-09-08 NOTE — PROGRESS NOTES
1940-Bedside and Verbal shift change report given to Rafia Payne RN (oncoming nurse) by Jacqueline Myers RN (offgoing nurse). Report included the following information SBAR, Kardex, Procedure Summary, Intake/Output and MAR.

## 2019-09-08 NOTE — PROGRESS NOTES
0750 Bedside and Verbal shift change report given to John De La Rosa (oncoming nurse) by Shawn Rivera (offgoing nurse). Report included the following information SBAR, Kardex, Intake/Output and MAR. 36 Assisted pt with bathing, spouse assisted. Clean gown provided. 1311 2.75 sec pause per Jan, RN, telemetry     1518 Shift uneventful thus far. Pt tolerating absolute bedrest. Pt has great urinary output. Dressing clean, dry, and intact on LLE. Pt has no complaints of pain or N/V. Lung sounds clear bilaterally. S1, S2 heard, no murmur detected. Active bowel sounds in all four quadrants. CPAP utilized when pt is sleeping. Pt does have a tendency to trend bradycardia when sleeping. Call bell in reach, will continue to monitor. 1600 Dr. Payam Bell at bedside    1617 Pedal pulse in LLE palpable & strong, pt able to move all toes appropriately. Pt denies numbness & tingling in LLE    2005 Bedside and Verbal shift change report given to Shawn Rivera (oncoming nurse) by John De La Rosa (offgoing nurse). Report included the following information SBAR, Kardex, Intake/Output and MAR.

## 2019-09-09 PROCEDURE — 3331090002 HH PPS REVENUE DEBIT

## 2019-09-09 PROCEDURE — 65270000029 HC RM PRIVATE

## 2019-09-09 PROCEDURE — 3331090001 HH PPS REVENUE CREDIT

## 2019-09-09 PROCEDURE — 74011000250 HC RX REV CODE- 250: Performed by: PLASTIC SURGERY

## 2019-09-09 PROCEDURE — 94640 AIRWAY INHALATION TREATMENT: CPT

## 2019-09-09 PROCEDURE — 74011250637 HC RX REV CODE- 250/637: Performed by: PLASTIC SURGERY

## 2019-09-09 PROCEDURE — 74011250636 HC RX REV CODE- 250/636: Performed by: PLASTIC SURGERY

## 2019-09-09 PROCEDURE — 94761 N-INVAS EAR/PLS OXIMETRY MLT: CPT

## 2019-09-09 RX ADMIN — ACYCLOVIR 400 MG: 200 CAPSULE ORAL at 21:56

## 2019-09-09 RX ADMIN — ACYCLOVIR 400 MG: 200 CAPSULE ORAL at 00:52

## 2019-09-09 RX ADMIN — BUDESONIDE 500 MCG: 0.5 INHALANT RESPIRATORY (INHALATION) at 20:26

## 2019-09-09 RX ADMIN — SODIUM CHLORIDE, SODIUM LACTATE, POTASSIUM CHLORIDE, AND CALCIUM CHLORIDE 100 ML/HR: 600; 310; 30; 20 INJECTION, SOLUTION INTRAVENOUS at 10:27

## 2019-09-09 RX ADMIN — ACYCLOVIR 400 MG: 200 CAPSULE ORAL at 16:47

## 2019-09-09 RX ADMIN — PRAVASTATIN SODIUM 40 MG: 20 TABLET ORAL at 21:57

## 2019-09-09 RX ADMIN — ACYCLOVIR 400 MG: 200 CAPSULE ORAL at 11:57

## 2019-09-09 RX ADMIN — ACETAMINOPHEN 650 MG: 325 TABLET, FILM COATED ORAL at 21:55

## 2019-09-09 RX ADMIN — DABIGATRAN ETEXILATE MESYLATE 150 MG: 150 CAPSULE ORAL at 10:27

## 2019-09-09 RX ADMIN — ACETAMINOPHEN 650 MG: 325 TABLET, FILM COATED ORAL at 12:01

## 2019-09-09 RX ADMIN — BUDESONIDE 500 MCG: 0.5 INHALANT RESPIRATORY (INHALATION) at 08:03

## 2019-09-09 RX ADMIN — ACYCLOVIR 400 MG: 200 CAPSULE ORAL at 05:54

## 2019-09-09 RX ADMIN — Medication 10 ML: at 16:48

## 2019-09-09 RX ADMIN — DABIGATRAN ETEXILATE MESYLATE 150 MG: 150 CAPSULE ORAL at 22:00

## 2019-09-09 NOTE — PROGRESS NOTES
Problem: Falls - Risk of  Goal: *Absence of Falls  Description  Document Areta Soledad Fall Risk and appropriate interventions in the flowsheet. Outcome: Progressing Towards Goal  Note:   Fall Risk Interventions:  Mobility Interventions: Patient to call before getting OOB, PT Consult for mobility concerns    Mentation Interventions: Door open when patient unattended    Medication Interventions: Patient to call before getting OOB    Elimination Interventions: Call light in reach    History of Falls Interventions: Door open when patient unattended         Problem: Patient Education: Go to Patient Education Activity  Goal: Patient/Family Education  Outcome: Progressing Towards Goal     Problem: Discharge Planning  Goal: *Discharge to safe environment  Outcome: Progressing Towards Goal     Problem: Pain  Goal: *Control of Pain  Outcome: Progressing Towards Goal     Problem: Patient Education: Go to Patient Education Activity  Goal: Patient/Family Education  Outcome: Progressing Towards Goal     Problem: Lower Extremity Wound Care  Goal: *Non-infected wound: Improvement of existing wound, absence of infection, and maintenance of skin integrity  Outcome: Progressing Towards Goal  Goal: *Infected Wound: Prevention of further infection and promotion of healing  Description  Infection control procedures (eg: clean dressings, clean gloves, hand washing, precautions to isolate wound from contamination, sterile instruments used for wound debridement) should be implemented. Outcome: Progressing Towards Goal  Goal: Interventions  Outcome: Progressing Towards Goal     Problem: Pressure Injury - Risk of  Goal: *Prevention of pressure injury  Description  Document Sonu Scale and appropriate interventions in the flowsheet.   Outcome: Progressing Towards Goal  Note:   Pressure Injury Interventions:  Sensory Interventions: Avoid rigorous massage over bony prominences         Activity Interventions: Pressure redistribution bed/mattress(bed type)    Mobility Interventions: HOB 30 degrees or less    Nutrition Interventions: Document food/fluid/supplement intake    Friction and Shear Interventions: HOB 30 degrees or less                Problem: Patient Education: Go to Patient Education Activity  Goal: Patient/Family Education  Outcome: Progressing Towards Goal     Problem: Altered nutrition  Goal: *Optimize nutritional status  Outcome: Progressing Towards Goal

## 2019-09-09 NOTE — PROGRESS NOTES
0800  Left foot splint, wiggle toes  Cool to touch,bedrest maintained. 1000  Offered pain med but refused at this time. 1100  Given Tylenol for pain of the left leg. Report to Christin to assume care.

## 2019-09-09 NOTE — PROGRESS NOTES
Patient appears to have suffered no surgical sequelae after restarting the anticoagulation - there have also been no adverse signs of cardiac problems after the surgical interval when the Pradaxa was stopped - the first dressing change is tomorrow . Mi Painter

## 2019-09-09 NOTE — PROGRESS NOTES
Bedside and Verbal shift change report given to Melissa Ulloa RN (oncoming nurse) by Aysha Babin RN (offgoing nurse). Report included the following information SBAR, Kardex, Procedure Summary, Intake/Output and MAR.

## 2019-09-09 NOTE — PROGRESS NOTES
Problem: Falls - Risk of  Goal: *Absence of Falls  Description  Document Arthur Rojas Fall Risk and appropriate interventions in the flowsheet. Outcome: Progressing Towards Goal  Note:   Fall Risk Interventions:  Mobility Interventions: Strengthening exercises (ROM-active/passive)    Mentation Interventions: Evaluate medications/consider consulting pharmacy, Door open when patient unattended, More frequent rounding, Reorient patient, Room close to nurse's station    Medication Interventions: Patient to call before getting OOB, Teach patient to arise slowly    Elimination Interventions: Call light in reach, Patient to call for help with toileting needs, Urinal in reach, Toilet paper/wipes in reach, Toileting schedule/hourly rounds    History of Falls Interventions: Door open when patient unattended         Problem: Patient Education: Go to Patient Education Activity  Goal: Patient/Family Education  Outcome: Progressing Towards Goal     Problem: Discharge Planning  Goal: *Discharge to safe environment  Outcome: Progressing Towards Goal     Problem: Pain  Goal: *Control of Pain  Outcome: Progressing Towards Goal     Problem: Patient Education: Go to Patient Education Activity  Goal: Patient/Family Education  Outcome: Progressing Towards Goal     Problem: Lower Extremity Wound Care  Goal: *Non-infected wound: Improvement of existing wound, absence of infection, and maintenance of skin integrity  Outcome: Progressing Towards Goal  Goal: *Infected Wound: Prevention of further infection and promotion of healing  Description  Infection control procedures (eg: clean dressings, clean gloves, hand washing, precautions to isolate wound from contamination, sterile instruments used for wound debridement) should be implemented.   Outcome: Progressing Towards Goal  Goal: Interventions  Outcome: Progressing Towards Goal     Problem: Pressure Injury - Risk of  Goal: *Prevention of pressure injury  Description  Document Sonu Scale and appropriate interventions in the flowsheet.   Outcome: Progressing Towards Goal  Note:   Pressure Injury Interventions:  Sensory Interventions: Assess changes in LOC, Assess need for specialty bed, Float heels, Keep linens dry and wrinkle-free, Maintain/enhance activity level, Minimize linen layers         Activity Interventions: Pressure redistribution bed/mattress(bed type)    Mobility Interventions: HOB 30 degrees or less, Pressure redistribution bed/mattress (bed type)    Nutrition Interventions: Document food/fluid/supplement intake    Friction and Shear Interventions: HOB 30 degrees or less                Problem: Patient Education: Go to Patient Education Activity  Goal: Patient/Family Education  Outcome: Progressing Towards Goal     Problem: Altered nutrition  Goal: *Optimize nutritional status  Outcome: Progressing Towards Goal

## 2019-09-09 NOTE — PROGRESS NOTES
Problem: Discharge Planning  Goal: *Discharge to safe environment  Outcome: Progressing Towards Goal   Home    Plan remains home, cm to follow for hh needs.

## 2019-09-09 NOTE — PROGRESS NOTES
Respiratory Therapy Assessment Care Plan    Patient:  Britany Fournier 71 y.o. male 9/9/2019 10:01 AM    Traumatic leg injury, left, initial encounter [S89.92XA]  Status post full thickness skin graft [Z94.5]  Cellulitis [L03.90]  Trauma [T14.90XA]      Chest X-RAY:   Results from Hospital Encounter encounter on 09/03/19   XR CHEST PA LAT    Impression IMPRESSION:    No active cardiopulmonary disease. Results from [place] encounter on 07/29/19   XR TIB/FIB LT    Impression IMPRESSION:  1. Large ulcerative skin lesion. There are some changes of cellulitis adjacent  to this. 2. No underlying bony abnormality. No plain film evidence of osteomyelitis. If  that remains a strong concern, MRI or bone scan on a routine basis could be  performed. 3. Knee replacement.               Vital Signs:   Visit Vitals  /79 (BP 1 Location: Left arm, BP Patient Position: At rest)   Pulse 62   Temp 97.7 °F (36.5 °C)   Resp 18   Ht 6' 1\" (1.854 m)   Wt 95.3 kg (210 lb)   SpO2 95%   BMI 27.71 kg/m²           Indications for treatment: MD order      Plan of care: Pulmicort BID        Goal: Maintain adequate gas exchange

## 2019-09-09 NOTE — PROGRESS NOTES
completed a follow up visit with patient and a Spiritual assessment of patient was done. Patient says he is doing ok this morning, leg and foot are not moving which means he has no pain at the present. Patient appears to be in good spirits.  Chaplains will continue to follow and will provide pastoral care on an as needed/requested basis    Chaplain Alvarez Donahue   Board Certified [address] [place] [address]   [phone]

## 2019-09-09 NOTE — PROGRESS NOTES
2005-Bedside and Verbal shift change report given to Christopher Hollins RN (oncoming nurse) by Yash Malhotra RN (offgoing nurse). Report included the following information SBAR, Kardex, Procedure Summary, Intake/Output and MAR.

## 2019-09-10 PROCEDURE — 3331090002 HH PPS REVENUE DEBIT

## 2019-09-10 PROCEDURE — 3331090001 HH PPS REVENUE CREDIT

## 2019-09-10 PROCEDURE — 74011000250 HC RX REV CODE- 250: Performed by: PLASTIC SURGERY

## 2019-09-10 PROCEDURE — 74011250637 HC RX REV CODE- 250/637: Performed by: PLASTIC SURGERY

## 2019-09-10 PROCEDURE — 77030018836 HC SOL IRR NACL ICUM -A

## 2019-09-10 PROCEDURE — 94640 AIRWAY INHALATION TREATMENT: CPT

## 2019-09-10 PROCEDURE — 65270000029 HC RM PRIVATE

## 2019-09-10 PROCEDURE — 94761 N-INVAS EAR/PLS OXIMETRY MLT: CPT

## 2019-09-10 PROCEDURE — 74011250636 HC RX REV CODE- 250/636: Performed by: PLASTIC SURGERY

## 2019-09-10 RX ADMIN — SODIUM CHLORIDE, SODIUM LACTATE, POTASSIUM CHLORIDE, AND CALCIUM CHLORIDE 100 ML/HR: 600; 310; 30; 20 INJECTION, SOLUTION INTRAVENOUS at 20:55

## 2019-09-10 RX ADMIN — ACYCLOVIR 400 MG: 200 CAPSULE ORAL at 11:08

## 2019-09-10 RX ADMIN — Medication 10 ML: at 22:00

## 2019-09-10 RX ADMIN — ACYCLOVIR 400 MG: 200 CAPSULE ORAL at 20:56

## 2019-09-10 RX ADMIN — SODIUM CHLORIDE, SODIUM LACTATE, POTASSIUM CHLORIDE, AND CALCIUM CHLORIDE 100 ML/HR: 600; 310; 30; 20 INJECTION, SOLUTION INTRAVENOUS at 08:03

## 2019-09-10 RX ADMIN — BUDESONIDE 500 MCG: 0.5 INHALANT RESPIRATORY (INHALATION) at 20:24

## 2019-09-10 RX ADMIN — Medication 10 ML: at 07:34

## 2019-09-10 RX ADMIN — DABIGATRAN ETEXILATE MESYLATE 150 MG: 150 CAPSULE ORAL at 11:08

## 2019-09-10 RX ADMIN — ACYCLOVIR 400 MG: 200 CAPSULE ORAL at 17:01

## 2019-09-10 RX ADMIN — ACYCLOVIR 400 MG: 200 CAPSULE ORAL at 07:33

## 2019-09-10 RX ADMIN — Medication 10 ML: at 14:00

## 2019-09-10 RX ADMIN — ACYCLOVIR 400 MG: 200 CAPSULE ORAL at 01:33

## 2019-09-10 RX ADMIN — PRAVASTATIN SODIUM 40 MG: 20 TABLET ORAL at 22:05

## 2019-09-10 RX ADMIN — BUDESONIDE 500 MCG: 0.5 INHALANT RESPIRATORY (INHALATION) at 07:46

## 2019-09-10 RX ADMIN — DABIGATRAN ETEXILATE MESYLATE 150 MG: 150 CAPSULE ORAL at 22:04

## 2019-09-10 RX ADMIN — ACETAMINOPHEN 650 MG: 325 TABLET, FILM COATED ORAL at 22:05

## 2019-09-10 NOTE — PROGRESS NOTES
First dressing change - 99.9% take of graft  - donor sites healing well - will start advancing schedule of dangling in the AM as directed by myself with the expectation of DC home on Thursday.

## 2019-09-10 NOTE — PROGRESS NOTES
Bedside shift report received from [facility] with sbar  No complaints of pain  Patient had large BM  Dr. Agata Rai in dressing changed to both sites  Order to dc tele and pulse ox obtained  Shift report given to [facility] with sbar

## 2019-09-10 NOTE — PROGRESS NOTES
Bedside and Verbal shift change report given to Aicha Rush (oncoming nurse) by Faheem Croft (offgoing nurse). Report included the following information SBAR, Kardex, MAR and Recent Results.

## 2019-09-10 NOTE — PROGRESS NOTES
2100: Patient in bed awake,alert and oriented x4. No signs of distress. Bed low and  Locked. Call bell within reach. Will continue monitoring. 0630: Patient in bed sleeping,uneventful night,no other concern at this time,call bell within reach. Will continue monitoring.   Patient Vitals for the past 24 hrs:   Temp Pulse Resp BP SpO2   09/10/19 0813 97.1 °F (36.2 °C) 87 16 135/90 97 %   09/10/19 0748     98 %   09/10/19 0222 97.7 °F (36.5 °C) 83 18 123/65 99 %   09/09/19 2155 98 °F (36.7 °C) 98 18 (!) 146/108 100 %   09/09/19 2026     96 %   09/09/19 1515 98.4 °F (36.9 °C) 65 18 146/87 98 %   09/09/19 1205 98.4 °F (36.9 °C) (!) 59 18 135/73 96 %

## 2019-09-10 NOTE — PROGRESS NOTES
Respiratory Therapy Assessment Care Plan    Patient:  Randell Leone 71 y.o. male 9/10/2019 9:19 AM    Traumatic leg injury, left, initial encounter [S89.92XA]  Status post full thickness skin graft [Z94.5]  Cellulitis [L03.90]  Trauma [T14.90XA]      Chest X-RAY:   Results from Hospital Encounter encounter on 09/03/19   XR CHEST PA LAT    Impression IMPRESSION:    No active cardiopulmonary disease. Results from [place] encounter on 07/29/19   XR TIB/FIB LT    Impression IMPRESSION:  1. Large ulcerative skin lesion. There are some changes of cellulitis adjacent  to this. 2. No underlying bony abnormality. No plain film evidence of osteomyelitis. If  that remains a strong concern, MRI or bone scan on a routine basis could be  performed. 3. Knee replacement.               Vital Signs:   Visit Vitals  /90 (BP Patient Position: Sitting)   Pulse 87   Temp 97.1 °F (36.2 °C)   Resp 16   Ht 6' 1\" (1.854 m)   Wt 95.3 kg (210 lb)   SpO2 97%   BMI 27.71 kg/m²           Indications for treatment:  MD order      Plan of care: Pulmicort BID        Goal: Maintain adequate gas exchange

## 2019-09-11 PROCEDURE — 74011000250 HC RX REV CODE- 250: Performed by: PLASTIC SURGERY

## 2019-09-11 PROCEDURE — 65270000029 HC RM PRIVATE

## 2019-09-11 PROCEDURE — 74011250636 HC RX REV CODE- 250/636: Performed by: PLASTIC SURGERY

## 2019-09-11 PROCEDURE — 3331090001 HH PPS REVENUE CREDIT

## 2019-09-11 PROCEDURE — 3331090002 HH PPS REVENUE DEBIT

## 2019-09-11 PROCEDURE — 94761 N-INVAS EAR/PLS OXIMETRY MLT: CPT

## 2019-09-11 PROCEDURE — 94640 AIRWAY INHALATION TREATMENT: CPT

## 2019-09-11 PROCEDURE — 74011250637 HC RX REV CODE- 250/637: Performed by: PLASTIC SURGERY

## 2019-09-11 RX ADMIN — ACYCLOVIR 400 MG: 200 CAPSULE ORAL at 21:50

## 2019-09-11 RX ADMIN — ACYCLOVIR 400 MG: 200 CAPSULE ORAL at 01:15

## 2019-09-11 RX ADMIN — SODIUM CHLORIDE, SODIUM LACTATE, POTASSIUM CHLORIDE, AND CALCIUM CHLORIDE 100 ML/HR: 600; 310; 30; 20 INJECTION, SOLUTION INTRAVENOUS at 21:57

## 2019-09-11 RX ADMIN — OXYCODONE HYDROCHLORIDE AND ACETAMINOPHEN 1 TABLET: 5; 325 TABLET ORAL at 08:03

## 2019-09-11 RX ADMIN — ACYCLOVIR 400 MG: 200 CAPSULE ORAL at 11:04

## 2019-09-11 RX ADMIN — DABIGATRAN ETEXILATE MESYLATE 150 MG: 150 CAPSULE ORAL at 21:54

## 2019-09-11 RX ADMIN — ACYCLOVIR 400 MG: 200 CAPSULE ORAL at 07:00

## 2019-09-11 RX ADMIN — BUDESONIDE 500 MCG: 0.5 INHALANT RESPIRATORY (INHALATION) at 07:46

## 2019-09-11 RX ADMIN — BUDESONIDE 500 MCG: 0.5 INHALANT RESPIRATORY (INHALATION) at 22:53

## 2019-09-11 RX ADMIN — ACYCLOVIR 400 MG: 200 CAPSULE ORAL at 15:48

## 2019-09-11 RX ADMIN — Medication 10 ML: at 15:50

## 2019-09-11 RX ADMIN — Medication 10 ML: at 06:00

## 2019-09-11 RX ADMIN — ACETAMINOPHEN 650 MG: 325 TABLET, FILM COATED ORAL at 21:54

## 2019-09-11 RX ADMIN — DABIGATRAN ETEXILATE MESYLATE 150 MG: 150 CAPSULE ORAL at 11:04

## 2019-09-11 RX ADMIN — Medication 10 ML: at 15:49

## 2019-09-11 RX ADMIN — SODIUM CHLORIDE, SODIUM LACTATE, POTASSIUM CHLORIDE, AND CALCIUM CHLORIDE 100 ML/HR: 600; 310; 30; 20 INJECTION, SOLUTION INTRAVENOUS at 08:19

## 2019-09-11 RX ADMIN — ACETAMINOPHEN 650 MG: 325 TABLET, FILM COATED ORAL at 14:17

## 2019-09-11 RX ADMIN — PRAVASTATIN SODIUM 40 MG: 20 TABLET ORAL at 21:49

## 2019-09-11 NOTE — PROGRESS NOTES
Problem: Falls - Risk of  Goal: *Absence of Falls  Description  Document Gloria Butterfield Fall Risk and appropriate interventions in the flowsheet. Outcome: Progressing Towards Goal  Note:   Fall Risk Interventions:  Mobility Interventions: Patient to call before getting OOB, Utilize walker, cane, or other assistive device, Utilize gait belt for transfers/ambulation    Mentation Interventions: Door open when patient unattended, Update white board    Medication Interventions: Patient to call before getting OOB, Teach patient to arise slowly, Utilize gait belt for transfers/ambulation    Elimination Interventions: Call light in reach, Urinal in reach    History of Falls Interventions: Door open when patient unattended         Problem: Patient Education: Go to Patient Education Activity  Goal: Patient/Family Education  Outcome: Progressing Towards Goal     Problem: Discharge Planning  Goal: *Discharge to safe environment  Outcome: Progressing Towards Goal     Problem: Pain  Goal: *Control of Pain  Outcome: Progressing Towards Goal     Problem: Patient Education: Go to Patient Education Activity  Goal: Patient/Family Education  Outcome: Progressing Towards Goal     Problem: Lower Extremity Wound Care  Goal: *Non-infected wound: Improvement of existing wound, absence of infection, and maintenance of skin integrity  Outcome: Progressing Towards Goal  Goal: *Infected Wound: Prevention of further infection and promotion of healing  Description  Infection control procedures (eg: clean dressings, clean gloves, hand washing, precautions to isolate wound from contamination, sterile instruments used for wound debridement) should be implemented. Outcome: Progressing Towards Goal  Goal: Interventions  Outcome: Progressing Towards Goal     Problem: Pressure Injury - Risk of  Goal: *Prevention of pressure injury  Description  Document Sonu Scale and appropriate interventions in the flowsheet.   Outcome: Progressing Towards Goal  Note: Pressure Injury Interventions:  Sensory Interventions: Assess changes in LOC, Keep linens dry and wrinkle-free         Activity Interventions: Increase time out of bed    Mobility Interventions: HOB 30 degrees or less, Pressure redistribution bed/mattress (bed type), PT/OT evaluation    Nutrition Interventions: Document food/fluid/supplement intake    Friction and Shear Interventions: HOB 30 degrees or less                Problem: Patient Education: Go to Patient Education Activity  Goal: Patient/Family Education  Outcome: Progressing Towards Goal     Problem: Altered nutrition  Goal: *Optimize nutritional status  Outcome: Progressing Towards Goal

## 2019-09-11 NOTE — PROGRESS NOTES
Patient is on home unit. Patient is resting comfortably. No respiratory distress is noted at this time.

## 2019-09-11 NOTE — PROGRESS NOTES
2102: Patient in bed awake,alert and oriented x4. No signs of distress. Bed low and locked. Call bell within reach. Will monitor. 0630: In bed sleeping,uneventful night,no other concern at this time,call bell within reach. Will monitor.   Patient Vitals for the past 12 hrs:   Temp Pulse Resp BP SpO2   09/11/19 0746     96 %   09/11/19 0316 98 °F (36.7 °C) 73 18 113/73 96 %   09/10/19 2058 98.4 °F (36.9 °C) 71 18 152/79 94 %   09/10/19 2026     98 %

## 2019-09-11 NOTE — ROUTINE PROCESS
Bedside and Verbal shift change report given to [facility] (oncoming nurse) by Issa Thomas (offgoing nurse). Report included the following information SBAR, Kardex, MAR and Recent Results.

## 2019-09-11 NOTE — PROGRESS NOTES
4442 Bedside and Verbal shift change report given to [facility], RN (oncoming nurse) by ALBERT Martin (offgoing nurse). Report included the following information SBAR, Kardex, Intake/Output and MAR. Pt A & O x 4. Pt maintaining absolute bedrest. Pt has a strong pedal pulse in LLE. Dressing on left thigh is in tact with old drainage, no breakthrough drainage or active bleedfing present. IV in right arm patent with 0 signs of infiltration. Pt's LBM 9/9/19. Pt has a great appetite and great urinary output. Call bell in reach, will continue to monitor. 1845 Pt ambulate to restroom with assist, pt had bowel movement    1925 Bedside and Verbal shift change report given to [address] (oncoming nurse) by [facility] (offgoing nurse). Report included the following information SBAR, Kardex, Intake/Output and MAR.

## 2019-09-12 VITALS
OXYGEN SATURATION: 97 % | RESPIRATION RATE: 18 BRPM | TEMPERATURE: 98 F | HEIGHT: 73 IN | HEART RATE: 65 BPM | BODY MASS INDEX: 27.83 KG/M2 | SYSTOLIC BLOOD PRESSURE: 132 MMHG | DIASTOLIC BLOOD PRESSURE: 79 MMHG | WEIGHT: 210 LBS

## 2019-09-12 PROCEDURE — 3331090001 HH PPS REVENUE CREDIT

## 2019-09-12 PROCEDURE — 94640 AIRWAY INHALATION TREATMENT: CPT

## 2019-09-12 PROCEDURE — 3331090002 HH PPS REVENUE DEBIT

## 2019-09-12 PROCEDURE — 74011250637 HC RX REV CODE- 250/637: Performed by: PLASTIC SURGERY

## 2019-09-12 PROCEDURE — 74011000250 HC RX REV CODE- 250: Performed by: PLASTIC SURGERY

## 2019-09-12 RX ADMIN — DABIGATRAN ETEXILATE MESYLATE 150 MG: 150 CAPSULE ORAL at 08:29

## 2019-09-12 RX ADMIN — ACYCLOVIR 400 MG: 200 CAPSULE ORAL at 10:21

## 2019-09-12 RX ADMIN — ACYCLOVIR 400 MG: 200 CAPSULE ORAL at 06:03

## 2019-09-12 RX ADMIN — ACYCLOVIR 400 MG: 200 CAPSULE ORAL at 01:04

## 2019-09-12 RX ADMIN — ACETAMINOPHEN 650 MG: 325 TABLET, FILM COATED ORAL at 08:29

## 2019-09-12 RX ADMIN — BUDESONIDE 500 MCG: 0.5 INHALANT RESPIRATORY (INHALATION) at 08:11

## 2019-09-12 NOTE — PROGRESS NOTES
completed follow up visit with patient in room 21  and a Spiritual assessment of patient was done. Patient was excited to hear that he will be discharged this afternoon.  Chaplains will continue to follow and will provide pastoral care on an as needed/requested basis    mayelin Donahue   Board Certified [address] [place] [address]   [phone]

## 2019-09-12 NOTE — PROGRESS NOTES
Bedside and Verbal shift change report given to Prema Recinos RN (oncoming nurse) by Aysha Babin RN (offgoing nurse). Report included the following information SBAR, Kardex, ED Summary, OR Summary, Procedure Summary and Intake/Output.

## 2019-09-12 NOTE — PROGRESS NOTES
Respiratory Therapy Assessment Care Plan    Patient:  Maryjean Hodgkins 71 y.o. male 9/12/2019 8:15 AM    Traumatic leg injury, left, initial encounter [S89.92XA]  Status post full thickness skin graft [Z94.5]  Cellulitis [L03.90]  Trauma [T14.90XA]      Chest X-RAY:   Results from Hospital Encounter encounter on 09/03/19   XR CHEST PA LAT    Impression IMPRESSION:    No active cardiopulmonary disease. Results from [place] encounter on 07/29/19   XR TIB/FIB LT    Impression IMPRESSION:  1. Large ulcerative skin lesion. There are some changes of cellulitis adjacent  to this. 2. No underlying bony abnormality. No plain film evidence of osteomyelitis. If  that remains a strong concern, MRI or bone scan on a routine basis could be  performed. 3. Knee replacement.           Vital Signs:     Visit Vitals  /78 (BP 1 Location: Right arm, BP Patient Position: At rest)   Pulse 63   Temp 97.9 °F (36.6 °C)   Resp 18   Ht 6' 1\" (1.854 m)   Wt 95.3 kg (210 lb)   SpO2 96%   BMI 27.71 kg/m²         Indications for treatment: Post surgery       Plan of care: Pulmicort bid      Goal: patient to understand delivery of medication

## 2019-09-12 NOTE — PROGRESS NOTES
1915-Bedside and Verbal shift change report given to Aysha Babin RN (oncoming nurse) by Andrew Rai RN (offgoing nurse).  Report included the following information SBAR, Kardex, Procedure Summary, Intake/Output and MAR.     2150- Complaint of pain 5 of 10, prn tylenol given per patient request.

## 2019-09-12 NOTE — DISCHARGE INSTRUCTIONS
DISCHARGE SUMMARY from Nurse    PATIENT INSTRUCTIONS:    After general anesthesia or intravenous sedation, for 24 hours or while taking prescription Narcotics:  · Limit your activities  · Do not drive and operate hazardous machinery  · Do not make important personal or business decisions  · Do  not drink alcoholic beverages  · If you have not urinated within 8 hours after discharge, please contact your surgeon on call. Report the following to your surgeon:  · Excessive pain, swelling, redness or odor of or around the surgical area  · Temperature over 100.5  · Nausea and vomiting lasting longer than 4 hours or if unable to take medications  · Any signs of decreased circulation or nerve impairment to extremity: change in color, persistent  numbness, tingling, coldness or increase pain  · Any questions    What to do at Home:  Recommended activity: Activity as tolerated: per adhere to Dr. Kwesi Pretty recommendations in reference to activity. *  Please give a list of your current medications to your Primary Care Provider. *  Please update this list whenever your medications are discontinued, doses are      changed, or new medications (including over-the-counter products) are added. *  Please carry medication information at all times in case of emergency situations. These are general instructions for a healthy lifestyle:    No smoking/ No tobacco products/ Avoid exposure to second hand smoke  Surgeon General's Warning:  Quitting smoking now greatly reduces serious risk to your health.     Obesity, smoking, and sedentary lifestyle greatly increases your risk for illness    A healthy diet, regular physical exercise & weight monitoring are important for maintaining a healthy lifestyle    You may be retaining fluid if you have a history of heart failure or if you experience any of the following symptoms:  Weight gain of 3 pounds or more overnight or 5 pounds in a week, increased swelling in our hands or feet or shortness of breath while lying flat in bed. Please call your doctor as soon as you notice any of these symptoms; do not wait until your next office visit. The discharge information has been reviewed with the patient. The patient verbalized understanding. Discharge medications reviewed with the patient and appropriate educational materials and side effects teaching were provided. Patient Education        Learning About Returning to Activity After Injury  What's important to know about injury recovery? Recovery from an injury takes time. Healing can take longer than you want it to. You may be tempted to return to your normal activities before you have fully healed. But stressing an injury makes it take even longer to heal. And you could make your injury worse than it's ever been. An injury heals fastest when you give it the rest and special care it needs. Your doctor can help you know when you're ready to ease back into normal activity. How can you help an injury heal?  You can speed up healing by avoiding movements that make it worse. It's also important to follow your doctor's instructions. · Ask your doctor if you can take an over-the-counter pain medicine, such as acetaminophen (Tylenol), ibuprofen (Advil, Motrin), or naproxen (Aleve). Be safe with medicines. Read and follow all instructions on the label. · Put ice or a cold pack on the area for 10 to 20 minutes at a time to ease pain. Put a thin cloth between the ice and your skin. · If your doctor gave you a splint, a pair of crutches, or a sling, use it exactly as directed. Physical or occupational therapy can help you learn how to move in new ways and to recover from an injury. If you are given exercises to do, ease into them. Start each exercise slowly. Ease off an exercise if you start to have pain. When you have a routine of exercises, do them as often and as long as prescribed.   While you heal, it also helps to keep the rest of your body moving. A physical therapist can suggest other exercises or ways of doing things to keep up your strength and energy. How do you return to activity? Slowly ease back into normal activity so you don't injure yourself again. A reinjury can be harder to heal than an original injury. If you are getting back into a sport, do it step by step. A  or physical therapist can help you do this safely. Start with short, easy movements or workouts. Then slowly add more over time. · Warm up before and stretch after the activity. · Stop what you're doing if it hurts. · When you're done, use ice to prevent pain and swelling. It may help to make some changes. For example, if a sport caused tendon pain, try another one for a while. If using a tool causes pain, change your  or use the other hand. When should you call for help? Watch closely for changes in your health, and be sure to contact your doctor if:  · Your symptoms are getting worse. · You have new symptoms, such as numbness or weakness. · You do not get better as expected. Follow-up care is a key part of your treatment and safety. Be sure to make and go to all appointments, and call your doctor if you are having problems. It's also a good idea to know your test results and keep a list of the medicines you take. Where can you learn more? Go to http://thomas-elio.info/. Enter B834 in the search box to learn more about \"Learning About Returning to Activity After Injury. \"  Current as of: September 20, 2018  Content Version: 12.1  © 9927-3626 [employer]. Care instructions adapted under license by [employer] (which disclaims liability or warranty for this information). If you have questions about a medical condition or this instruction, always ask your healthcare professional. [address] any warranty or liability for your use of this information. ___________________________________________________________________________________________________________________________________

## 2019-09-12 NOTE — PROGRESS NOTES
Problem: Discharge Planning  Goal: *Discharge to safe environment  Outcome: resolved/metl  [facility]    Care Management Interventions  PCP Verified by CM: Yes(Dr Carmichael)  Mode of Transport at Discharge:  Other (see comment)(sig other)  Transition of Care Consult (CM Consult): Discharge Planning  Current Support Network: Own Home(with sig other)  Confirm Follow Up Transport: Self  Plan discussed with Pt/Family/Caregiver: Yes  Discharge Location  Discharge Placement: Home

## 2019-09-12 NOTE — DISCHARGE SUMMARY
Discharge Summary     Admit Date: 9/4/2019  Discharge Date:  9/12/2019      Patient: Analy Caruso MRN: [id number]  SSN: [id number]    YOB: 1950  Age: 71 y.o. Sex: male        Discharge Diagnosis:  Traumatic skin loss of the left lower leg    Current Discharge Medication List      CONTINUE these medications which have NOT CHANGED    Details   B.infantis-B.ani-B.long-B.bifi (PROBIOTIC 4X) 10-15 mg TbEC Take  by mouth. dabigatran etexilate (PRADAXA) 150 mg capsule Take 150 mg by mouth every twelve (12) hours. hold for poss surgery 8/28    Comments: anticoagulant      acyclovir (ZOVIRAX) 400 mg tablet Take 400 mg by mouth every four (4) hours (while awake). pravastatin (PRAVACHOL) 40 mg tablet Take 40 mg by mouth nightly. fluticasone propionate (FLOVENT HFA) 110 mcg/actuation inhaler Take 1 Puff by inhalation every twelve (12) hours. STOP taking these medications       ibuprofen (MOTRIN) 600 mg tablet Comments:   Reason for Stopping:               Operative Procedures:  Full thickness skin grafting of the left lower leg defect    Consultants:      Hospital Course:  [facility]    Physical Exam on Discharge:  Visit Vitals  /78 (BP 1 Location: Right arm, BP Patient Position: At rest)   Pulse 63   Temp 97.9 °F (36.6 °C)   Resp 18   Ht 6' 1\" (1.854 m)   Wt 95.3 kg (210 lb)   SpO2 96%   BMI 27.71 kg/m²       General appearance: cooperative, no distress, appears stated age    [de-identified] Recent BMP and CBC:   No results for input(s): WBC, WBCLT, HGB, HGBP, HCT, PHCT, RBCH, PLT, MCV, HGBEXT, HCTEXT, PLTEXT in the last 72 hours. No lab exists for component: HGBI, IHGB, WBHGB, HCTI, IHCT, WBHCT    No results for input(s): NA, K, CL, CO2, AGAP, GLU, BUN, CREA, BUCR, GFRAA, GFRNA, CA, GFRAA in the last 72 hours.     No lab exists for component: KP, CLP, CO2P, GLUC, BUNPL, CREAP, CAPL    Condition at discharge:   good    Disposition:  Home    PCP:  Hamilton Schirmer, MD    [facility] Allen Marie MD  September 12, 2019  9:10 AM

## 2019-09-12 NOTE — PROGRESS NOTES
Problem: Falls - Risk of  Goal: *Absence of Falls  Description  Document Helene Johnson Fall Risk and appropriate interventions in the flowsheet. Outcome: Progressing Towards Goal  Note:   Fall Risk Interventions:  Mobility Interventions: Strengthening exercises (ROM-active/passive)    Mentation Interventions: Adequate sleep, hydration, pain control, Eyeglasses and hearing aids, Update white board    Medication Interventions: Patient to call before getting OOB, Teach patient to arise slowly    Elimination Interventions: Call light in reach, Patient to call for help with toileting needs, Urinal in reach, Toileting schedule/hourly rounds    History of Falls Interventions: Door open when patient unattended         Problem: Patient Education: Go to Patient Education Activity  Goal: Patient/Family Education  Outcome: Progressing Towards Goal     Problem: Discharge Planning  Goal: *Discharge to safe environment  Outcome: Progressing Towards Goal     Problem: Pain  Goal: *Control of Pain  Outcome: Progressing Towards Goal     Problem: Patient Education: Go to Patient Education Activity  Goal: Patient/Family Education  Outcome: Progressing Towards Goal     Problem: Lower Extremity Wound Care  Goal: *Non-infected wound: Improvement of existing wound, absence of infection, and maintenance of skin integrity  Outcome: Progressing Towards Goal  Goal: *Infected Wound: Prevention of further infection and promotion of healing  Description  Infection control procedures (eg: clean dressings, clean gloves, hand washing, precautions to isolate wound from contamination, sterile instruments used for wound debridement) should be implemented. Outcome: Progressing Towards Goal  Goal: Interventions  Outcome: Progressing Towards Goal     Problem: Pressure Injury - Risk of  Goal: *Prevention of pressure injury  Description  Document Sonu Scale and appropriate interventions in the flowsheet.   Outcome: Progressing Towards Goal  Note:   Pressure Injury Interventions:  Sensory Interventions: Keep linens dry and wrinkle-free, Maintain/enhance activity level    Moisture Interventions: Absorbent underpads    Activity Interventions: Pressure redistribution bed/mattress(bed type)    Mobility Interventions: HOB 30 degrees or less, Float heels, Pressure redistribution bed/mattress (bed type)    Nutrition Interventions: Document food/fluid/supplement intake    Friction and Shear Interventions: HOB 30 degrees or less, Minimize layers                Problem: Patient Education: Go to Patient Education Activity  Goal: Patient/Family Education  Outcome: Progressing Towards Goal     Problem: Altered nutrition  Goal: *Optimize nutritional status  Outcome: Progressing Towards Goal

## 2019-09-13 PROCEDURE — 3331090001 HH PPS REVENUE CREDIT

## 2019-09-13 PROCEDURE — 3331090002 HH PPS REVENUE DEBIT

## 2019-09-14 PROCEDURE — 3331090001 HH PPS REVENUE CREDIT

## 2019-09-14 PROCEDURE — 3331090002 HH PPS REVENUE DEBIT

## 2019-09-15 PROCEDURE — 3331090002 HH PPS REVENUE DEBIT

## 2019-09-15 PROCEDURE — 3331090001 HH PPS REVENUE CREDIT

## 2019-09-16 PROCEDURE — 3331090001 HH PPS REVENUE CREDIT

## 2019-09-16 PROCEDURE — 3331090002 HH PPS REVENUE DEBIT

## 2019-09-16 NOTE — OP NOTES
[facility]  OPERATIVE REPORT    Name:  Catarino Al  MR#:   [id number]  :  1950  ACCOUNT #:  [de-identified]  DATE OF SERVICE:  2019    PREOPERATIVE DIAGNOSIS:  Left lower leg skin loss after trauma and cellulitis. POSTOPERATIVE DIAGNOSIS:  Left lower leg skin loss after trauma and cellulitis. PROCEDURE PERFORMED:  Sharp debridement and skin grafting of the left lower leg open wound measuring 11 x 6.5 cm using full-thickness skin grafting from the ipsilateral lateral thigh and 200 sq cm split-thickness skin grafting for the resurfacing of the above-mentioned full-thickness skin graft donor site. Short leg plaster of Maddison splint. SURGEON:  Jennie Egan MD    ASSISTANT:  Radha Leon. ANESTHESIA:  General.    COMPLICATIONS:  None. SPECIMENS REMOVED:  Sharply debrided a little tissue from left lower leg soft tissue defect. IMPLANTS:  None. ESTIMATED BLOOD LOSS:  Minimal.    FINDINGS:  As above. No sign of infection, with good vascularity of bed. PROCEDURE:  The patient placed on the operating table in a supine position. The Franky stocking on the right lower leg was inflated prior to induction of anesthesia, and the entirety of the left leg was prepped and draped. Methylene blue was used to stain the granulation tissue on the soft tissue defect on the lower leg, which measured 11 x 6.5 cm, and then using the Goulian knife and the #15 bladed scalpel, all the surface granulation tissue was sharply debrided to remove the infected metrial and to demonstrate good vascularity and capillary bleeding in the underlying bed. Topical epinephrine was placed over the wound, while a full-thickness skin graft was harvested from the ipsilateral lateral thigh using the Rosy electric dermatome set at 30,000 of an inch, which removed all the epithelium and the dermis of his lateral thigh, exposing the underlying fat.   Full-thickness skin graft was meshed in the ratio of 1.5:1, not so the expansion would be increased but so that there would be adequate drainage underneath the full-thickness skin graft. The full-thickness skin graft was stapled into position and then held in place with a layer of Xeroform and a tie-over dressing of surgical cotton soaked in equal proportions of mineral oil and saline. It was held in place with a web of 3-0 nylon suture which laced back and forth so as to keep the skin graft totally immobilized. The full-thickness skin graft donor site was resurfaced with the epithelium taken from the anterior ipsilateral thigh, again using the Rosy electric dermatome set at 14,000 of an inch and this was then meshed in the same ratio and stapled into position over the donor site. However, it would have been unreasonable to have used this very thin skin on the lower leg because it would have had no durability. The two donor sites were covered with a single layer of Xeroform separately and this was stapled into position. Gauze, sponges were placed separately over the two wounds and this was all held in place with non-circumferential Hypafix tape. The patient was then fully immobilized from the knee down with a well-padded plaster of Maddison short leg splint and this was held in place with a loosely wrapped Ace bandage. The patient was returned to recovery room in a stable condition. There were no complications, blood loss was minimal, and the patient was to be admitted to the hospital for 7 days of absolute bed rest to ensure that the hydrostatic pressure did not dislodge the graft when the patient resumes a standing or sitting position.         Malvin Zepeda MD      [id number]  D:  09/15/2019 22:05  T:  09/16/2019 4:29  JOB #:  [id number]  CC:  Kwesi Prasad MD

## 2019-09-17 PROCEDURE — 3331090001 HH PPS REVENUE CREDIT

## 2019-09-17 PROCEDURE — 3331090002 HH PPS REVENUE DEBIT

## 2019-09-18 PROCEDURE — 3331090001 HH PPS REVENUE CREDIT

## 2019-09-18 PROCEDURE — 3331090002 HH PPS REVENUE DEBIT

## 2019-09-19 PROCEDURE — 3331090002 HH PPS REVENUE DEBIT

## 2019-09-19 PROCEDURE — 3331090001 HH PPS REVENUE CREDIT

## 2019-09-20 PROCEDURE — 3331090001 HH PPS REVENUE CREDIT

## 2019-09-20 PROCEDURE — 3331090002 HH PPS REVENUE DEBIT

## 2019-09-21 PROCEDURE — 3331090002 HH PPS REVENUE DEBIT

## 2019-09-21 PROCEDURE — 3331090001 HH PPS REVENUE CREDIT

## 2019-09-22 PROCEDURE — 3331090002 HH PPS REVENUE DEBIT

## 2019-09-22 PROCEDURE — 3331090001 HH PPS REVENUE CREDIT

## 2019-09-23 PROCEDURE — 3331090001 HH PPS REVENUE CREDIT

## 2019-09-23 PROCEDURE — 3331090002 HH PPS REVENUE DEBIT

## 2019-09-24 PROCEDURE — 3331090001 HH PPS REVENUE CREDIT

## 2019-09-24 PROCEDURE — 3331090002 HH PPS REVENUE DEBIT

## 2019-09-25 PROCEDURE — 3331090001 HH PPS REVENUE CREDIT

## 2019-09-25 PROCEDURE — 3331090002 HH PPS REVENUE DEBIT

## 2019-09-26 PROCEDURE — 3331090001 HH PPS REVENUE CREDIT

## 2019-09-26 PROCEDURE — 3331090002 HH PPS REVENUE DEBIT

## 2019-09-27 PROCEDURE — 3331090002 HH PPS REVENUE DEBIT

## 2019-09-27 PROCEDURE — 3331090001 HH PPS REVENUE CREDIT

## 2019-09-28 PROCEDURE — 3331090001 HH PPS REVENUE CREDIT

## 2019-09-28 PROCEDURE — 3331090002 HH PPS REVENUE DEBIT

## 2019-09-29 PROCEDURE — 3331090001 HH PPS REVENUE CREDIT

## 2019-09-29 PROCEDURE — 3331090002 HH PPS REVENUE DEBIT

## 2019-09-30 ENCOUNTER — HOME CARE VISIT (OUTPATIENT)
Dept: HOME HEALTH SERVICES | Facility: HOME HEALTH | Age: 69
End: 2019-09-30
Payer: MEDICARE

## 2020-05-22 ENCOUNTER — HOSPITAL ENCOUNTER (EMERGENCY)
Age: 70
Discharge: HOME | End: 2020-05-22
Attending: EMERGENCY MEDICINE | Admitting: EMERGENCY MEDICINE
Payer: MEDICARE

## 2020-05-22 ENCOUNTER — APPOINTMENT (OUTPATIENT)
Dept: CT IMAGING | Age: 70
End: 2020-05-22
Attending: EMERGENCY MEDICINE
Payer: MEDICARE

## 2020-05-22 VITALS
BODY MASS INDEX: 28.89 KG/M2 | HEART RATE: 47 BPM | DIASTOLIC BLOOD PRESSURE: 86 MMHG | OXYGEN SATURATION: 96 % | SYSTOLIC BLOOD PRESSURE: 105 MMHG | WEIGHT: 218 LBS | TEMPERATURE: 97.9 F | RESPIRATION RATE: 13 BRPM | HEIGHT: 73 IN

## 2020-05-22 DIAGNOSIS — R07.9 CHEST PAIN, UNSPECIFIED TYPE: Primary | ICD-10-CM

## 2020-05-22 LAB
ALBUMIN SERPL-MCNC: 4.2 G/DL (ref 3.4–5)
ALBUMIN/GLOB SERPL: 1.4 {RATIO} (ref 0.8–1.7)
ALP SERPL-CCNC: 54 U/L (ref 45–117)
ALT SERPL-CCNC: 45 U/L (ref 16–61)
ANION GAP SERPL CALC-SCNC: 4 MMOL/L (ref 3–18)
AST SERPL-CCNC: 42 U/L (ref 10–38)
BASOPHILS # BLD: 0 K/UL (ref 0–0.1)
BASOPHILS NFR BLD: 0 % (ref 0–2)
BILIRUB SERPL-MCNC: 0.7 MG/DL (ref 0.2–1)
BNP SERPL-MCNC: 235 PG/ML (ref 0–900)
BUN SERPL-MCNC: 18 MG/DL (ref 7–18)
BUN/CREAT SERPL: 19 (ref 12–20)
CALCIUM SERPL-MCNC: 8.5 MG/DL (ref 8.5–10.1)
CHLORIDE SERPL-SCNC: 107 MMOL/L (ref 100–111)
CO2 SERPL-SCNC: 26 MMOL/L (ref 21–32)
CREAT SERPL-MCNC: 0.94 MG/DL (ref 0.6–1.3)
DIFFERENTIAL METHOD BLD: ABNORMAL
EOSINOPHIL # BLD: 0.2 K/UL (ref 0–0.4)
EOSINOPHIL NFR BLD: 3 % (ref 0–5)
ERYTHROCYTE [DISTWIDTH] IN BLOOD BY AUTOMATED COUNT: 13.5 % (ref 11.6–14.5)
GLOBULIN SER CALC-MCNC: 3.1 G/DL (ref 2–4)
GLUCOSE SERPL-MCNC: 90 MG/DL (ref 74–99)
HCT VFR BLD AUTO: 42.7 % (ref 36–48)
HGB BLD-MCNC: 14.5 G/DL (ref 13–16)
LYMPHOCYTES # BLD: 1.4 K/UL (ref 0.9–3.6)
LYMPHOCYTES NFR BLD: 31 % (ref 21–52)
MCH RBC QN AUTO: 32.7 PG (ref 24–34)
MCHC RBC AUTO-ENTMCNC: 34 G/DL (ref 31–37)
MCV RBC AUTO: 96.2 FL (ref 74–97)
MONOCYTES # BLD: 0.5 K/UL (ref 0.05–1.2)
MONOCYTES NFR BLD: 11 % (ref 3–10)
NEUTS SEG # BLD: 2.5 K/UL (ref 1.8–8)
NEUTS SEG NFR BLD: 55 % (ref 40–73)
PLATELET # BLD AUTO: 165 K/UL (ref 135–420)
PMV BLD AUTO: 10.5 FL (ref 9.2–11.8)
POTASSIUM SERPL-SCNC: 3.9 MMOL/L (ref 3.5–5.5)
PROT SERPL-MCNC: 7.3 G/DL (ref 6.4–8.2)
RBC # BLD AUTO: 4.44 M/UL (ref 4.7–5.5)
SODIUM SERPL-SCNC: 137 MMOL/L (ref 136–145)
TROPONIN I SERPL-MCNC: 0.09 NG/ML (ref 0–0.04)
TROPONIN I SERPL-MCNC: 0.09 NG/ML (ref 0–0.04)
TROPONIN I SERPL-MCNC: 0.11 NG/ML (ref 0–0.04)
WBC # BLD AUTO: 4.6 K/UL (ref 4.6–13.2)

## 2020-05-22 PROCEDURE — 93005 ELECTROCARDIOGRAM TRACING: CPT

## 2020-05-22 PROCEDURE — 71275 CT ANGIOGRAPHY CHEST: CPT

## 2020-05-22 PROCEDURE — 74011000258 HC RX REV CODE- 258: Performed by: EMERGENCY MEDICINE

## 2020-05-22 PROCEDURE — 83880 ASSAY OF NATRIURETIC PEPTIDE: CPT

## 2020-05-22 PROCEDURE — 85025 COMPLETE CBC W/AUTO DIFF WBC: CPT

## 2020-05-22 PROCEDURE — 74011636320 HC RX REV CODE- 636/320: Performed by: EMERGENCY MEDICINE

## 2020-05-22 PROCEDURE — 84484 ASSAY OF TROPONIN QUANT: CPT

## 2020-05-22 PROCEDURE — 80053 COMPREHEN METABOLIC PANEL: CPT

## 2020-05-22 PROCEDURE — 99285 EMERGENCY DEPT VISIT HI MDM: CPT

## 2020-05-22 RX ORDER — SODIUM CHLORIDE 9 MG/ML
100 INJECTION, SOLUTION INTRAVENOUS
Status: COMPLETED | OUTPATIENT
Start: 2020-05-22 | End: 2020-05-22

## 2020-05-22 RX ADMIN — IOPAMIDOL 75 ML: 755 INJECTION, SOLUTION INTRAVENOUS at 17:10

## 2020-05-22 RX ADMIN — SODIUM CHLORIDE 78 ML: 900 INJECTION, SOLUTION INTRAVENOUS at 17:13

## 2020-05-22 NOTE — ED TRIAGE NOTES
Pt presents for cp that started approx 3 hours ago while painting. Pain is intermittent and radiates from R anterior chest to back. Pt denies N/V/D, chills. Pt also states for approx 2 weeks he has been getting SOB w/ his usual activities. H/o Afib.  On Xarelto

## 2020-05-22 NOTE — ED PROVIDER NOTES
EMERGENCY DEPARTMENT HISTORY AND PHYSICAL EXAM    2:20 PM      Date: 5/22/2020  Patient Name: Larissa Sahu    History of Presenting Illness     Chief Complaint   Patient presents with    Chest Pain         History Provided By: patient    Additional History (Context): Larissa Sahu is a 71 y.o. male presents with only history is atrial fibrillation has noted occasional shortness of breath with exertion, epigastric and left-sided chest pain, concerning today is while he was painting developed more severe left-sided chest pain and so he came to the ER. At the time of my first exam he is pain-free and just reports some mild shortness of breath. He takes Pradaxa for A. fib. Ludmila Leon PCP: Hattie Salazar MD    Chief Complaint:   Duration:    Timing:    Location:   Quality:   Severity:   Modifying Factors:   Associated Symptoms:       Current Outpatient Medications   Medication Sig Dispense Refill    B.infantis-B.ani-B.long-B.bifi (PROBIOTIC 4X) 10-15 mg TbEC Take  by mouth.  dabigatran etexilate (PRADAXA) 150 mg capsule Take 150 mg by mouth every twelve (12) hours. hold for poss surgery 8/28      acyclovir (ZOVIRAX) 400 mg tablet Take 400 mg by mouth every four (4) hours (while awake).  pravastatin (PRAVACHOL) 40 mg tablet Take 40 mg by mouth nightly.  fluticasone propionate (FLOVENT HFA) 110 mcg/actuation inhaler Take 1 Puff by inhalation every twelve (12) hours. 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 No pertinent family history. Social History:  Social History     Tobacco Use    Smoking status: Never Smoker    Smokeless tobacco: Never Used   Substance Use Topics    Alcohol use:  Yes Comment: soc    Drug use: Never       Allergies: Allergies   Allergen Reactions    Sulfa (Sulfonamide Antibiotics) Rash, Itching and Unknown (comments)     topical    Codeine Nausea and Vomiting    Sweet Potato Swelling         Review of Systems     Review of Systems   Constitutional: Negative for diaphoresis and fever. HENT: Negative for congestion and sore throat. Eyes: Negative for pain and itching. Respiratory: Positive for shortness of breath. Negative for cough. Cardiovascular: Positive for chest pain. Negative for palpitations. Gastrointestinal: Negative for abdominal pain and diarrhea. Endocrine: Negative for polydipsia and polyuria. Genitourinary: Negative for dysuria and hematuria. Musculoskeletal: Negative for arthralgias and myalgias. Skin: Negative for rash and wound. Neurological: Negative for seizures and syncope. Hematological: Does not bruise/bleed easily. Psychiatric/Behavioral: Negative for agitation and hallucinations. Physical Exam       Patient Vitals for the past 12 hrs:   Temp Pulse Resp BP SpO2   05/22/20 1730  (!) 50 13 119/65 97 %   05/22/20 1715  (!) 50 13 138/78 98 %   05/22/20 1342 97.9 °F (36.6 °C) 68 16 141/84 96 %       Physical Exam  Vitals signs and nursing note reviewed. Constitutional:       Appearance: He is well-developed. HENT:      Head: Normocephalic and atraumatic. Eyes:      General: No scleral icterus. Conjunctiva/sclera: Conjunctivae normal.   Neck:      Musculoskeletal: Normal range of motion and neck supple. Vascular: No JVD. Cardiovascular:      Rate and Rhythm: Bradycardia present. Rhythm irregular. Heart sounds: Normal heart sounds. Comments: 4 intact extremity pulses  Pulmonary:      Effort: Pulmonary effort is normal.      Breath sounds: Normal breath sounds. Abdominal:      Palpations: Abdomen is soft. There is no mass. Tenderness: There is no abdominal tenderness.    Musculoskeletal: Normal range of motion. Lymphadenopathy:      Cervical: No cervical adenopathy. Skin:     General: Skin is warm and dry. Neurological:      Mental Status: He is alert. Diagnostic Study Results   Labs -  Recent Results (from the past 12 hour(s))   EKG, 12 LEAD, INITIAL    Collection Time: 05/22/20  1:36 PM   Result Value Ref Range    Ventricular Rate 55 BPM    QRS Duration 84 ms    Q-T Interval 428 ms    QTC Calculation (Bezet) 409 ms    Calculated R Axis 41 degrees    Calculated T Axis 38 degrees    Diagnosis       Atrial fibrillation with slow ventricular response  Abnormal ECG  No previous ECGs available     CBC WITH AUTOMATED DIFF    Collection Time: 05/22/20  1:50 PM   Result Value Ref Range    WBC 4.6 4.6 - 13.2 K/uL    RBC 4.44 (L) 4.70 - 5.50 M/uL    HGB 14.5 13.0 - 16.0 g/dL    HCT 42.7 36.0 - 48.0 %    MCV 96.2 74.0 - 97.0 FL    MCH 32.7 24.0 - 34.0 PG    MCHC 34.0 31.0 - 37.0 g/dL    RDW 13.5 11.6 - 14.5 %    PLATELET 793 647 - 170 K/uL    MPV 10.5 9.2 - 11.8 FL    NEUTROPHILS 55 40 - 73 %    LYMPHOCYTES 31 21 - 52 %    MONOCYTES 11 (H) 3 - 10 %    EOSINOPHILS 3 0 - 5 %    BASOPHILS 0 0 - 2 %    ABS. NEUTROPHILS 2.5 1.8 - 8.0 K/UL    ABS. LYMPHOCYTES 1.4 0.9 - 3.6 K/UL    ABS. MONOCYTES 0.5 0.05 - 1.2 K/UL    ABS. EOSINOPHILS 0.2 0.0 - 0.4 K/UL    ABS. BASOPHILS 0.0 0.0 - 0.1 K/UL    DF AUTOMATED     METABOLIC PANEL, COMPREHENSIVE    Collection Time: 05/22/20  1:50 PM   Result Value Ref Range    Sodium 137 136 - 145 mmol/L    Potassium 3.9 3.5 - 5.5 mmol/L    Chloride 107 100 - 111 mmol/L    CO2 26 21 - 32 mmol/L    Anion gap 4 3.0 - 18 mmol/L    Glucose 90 74 - 99 mg/dL    BUN 18 7.0 - 18 MG/DL    Creatinine 0.94 0.6 - 1.3 MG/DL    BUN/Creatinine ratio 19 12 - 20      GFR est AA >60 >60 ml/min/1.73m2    GFR est non-AA >60 >60 ml/min/1.73m2    Calcium 8.5 8.5 - 10.1 MG/DL    Bilirubin, total 0.7 0.2 - 1.0 MG/DL    ALT (SGPT) 45 16 - 61 U/L    AST (SGOT) 42 (H) 10 - 38 U/L    Alk. phosphatase 54 45 - 117 U/L    Protein, total 7.3 6.4 - 8.2 g/dL    Albumin 4.2 3.4 - 5.0 g/dL    Globulin 3.1 2.0 - 4.0 g/dL    A-G Ratio 1.4 0.8 - 1.7     NT-PRO BNP    Collection Time: 05/22/20  1:50 PM   Result Value Ref Range    NT pro- 0 - 900 PG/ML   TROPONIN I    Collection Time: 05/22/20  1:50 PM   Result Value Ref Range    Troponin-I, QT 0.09 (H) 0.0 - 0.045 NG/ML   TROPONIN I    Collection Time: 05/22/20  4:53 PM   Result Value Ref Range    Troponin-I, QT 0.11 (H) 0.0 - 0.045 NG/ML   TROPONIN I    Collection Time: 05/22/20  7:54 PM   Result Value Ref Range    Troponin-I, QT 0.09 (H) 0.0 - 0.045 NG/ML       Radiologic Studies -   CTA CHEST W OR W WO CONT   Final Result   IMPRESSION:      No evidence of pulmonary embolism or aortic dissection. Small hiatal hernia. Cta Chest W Or W Wo Cont    Result Date: 5/22/2020  EXAM: CTA chest INDICATION: Chest pain COMPARISON: None TECHNIQUE: Axial CT imaging from the thoracic inlet through the diaphragm with intravenous contrast. Coronal and sagittal MIP reformats were generated. One or more dose reduction techniques were used on this CT: automated exposure control, adjustment of the mAs and/or kVp according to patient size, and iterative reconstruction techniques. The specific techniques used on this CT exam have been documented in the patient's electronic medical record. Digital Imaging and Communications in Medicine (DICOM) format image data are available to nonaffiliated external healthcare facilities or entities on a secure, media free, reciprocally searchable basis with patient authorization for at least a 12-month period after this study. _______________ FINDINGS: EXAM QUALITY: Overall exam quality is satisfactory. Pulmonary arterial enhancement is adequate with adequate breath hold and no significant artifact. PULMONARY ARTERIES: No evidence of pulmonary embolism. LYMPH Nodes: No enlarged lymph nodes seen. PLEURA: No pleural effusion seen. HEART: Normal in size with moderate calcific coronary disease. Is no pericardial effusion. VASCULATURE/MEDIASTINUM: There is a small hiatal hernia. Mild calcific atherosclerosis present. LUNGS: No suspicious nodule or mass. No abnormal opacities. AIRWAY: Normal. UPPER ABDOMEN: Unremarkable. OTHER: No acute or aggressive osseous abnormalities identified. _______________     IMPRESSION: No evidence of pulmonary embolism or aortic dissection. Small hiatal hernia. Medications ordered:   Medications   iopamidoL (ISOVUE-370) 76 % injection 100 mL (75 mL IntraVENous Given 5/22/20 1710)   0.9% sodium chloride infusion 100 mL (78 mL IntraVENous New Bag 5/22/20 1713)         Medical Decision Making   Initial Medical Decision Making and DDx:  Evaluate for ACS angina symptomatic bradycardia CHF. ED Course: Progress Notes, Reevaluation, and Consults:  ED Course as of May 22 2044   Fri May 22, 2020   1749 No evidence of STEMI, PE, pneumothorax pneumonia, aortic disease. Second troponin was a little bit up, will trend a third 1. Patient remains benign.    [CB]      ED Course User Index  [CB] Kevyn Marshall MD     8:44 PM Pt reevaluated at this time. Discussed results and findings, as well as, diagnosis and plan for discharge. Follow up with doctors/services listed. Return to the emergency department for alarming symptoms. Pt verbalizes understanding and agreement with plan. All questions addressed. Reassessment, patient is sleeping, no distress or pain. His third troponin is trending down, will discharge, emphasized need for follow-up with primary care for stress test, cardiac disease is still possible. I am the first provider for this patient. I reviewed the vital signs, available nursing notes, past medical history, past surgical history, family history and social history.     Patient Vitals for the past 12 hrs:   Temp Pulse Resp BP SpO2   05/22/20 1730  (!) 50 13 119/65 97 %   05/22/20 1715  (!) 50 13 138/78 98 %   05/22/20 1342 97.9 °F (36.6 °C) 68 16 141/84 96 %       Vital Signs-Reviewed the patient's vital signs. Pulse Oximetry Analysis, Cardiac Monitor, 12 lead ekg:      Interpreted by the EP. Records Reviewed: Nursing notes reviewed (Time of Review: 2:20 PM)    Procedures:   Critical Care Time:   Aspirin: (was aspirin given for stroke?)    Diagnosis     Clinical Impression:   1. Chest pain, unspecified type        Disposition: Discharged      Follow-up Information     Follow up With Specialties Details Why Contact Info    Theron England MD [facility] In 2 days  [address] [facility]  [phone]             Patient's Medications   Start Taking    No medications on file   Continue Taking    ACYCLOVIR (ZOVIRAX) 400 MG TABLET    Take 400 mg by mouth every four (4) hours (while awake). B.INFANTIS-B. ANI-B. LONG-B.BIFI (PROBIOTIC 4X) 10-15 MG TBEC    Take  by mouth. DABIGATRAN ETEXILATE (PRADAXA) 150 MG CAPSULE    Take 150 mg by mouth every twelve (12) hours. hold for poss surgery 8/28    FLUTICASONE PROPIONATE (FLOVENT HFA) 110 MCG/ACTUATION INHALER    Take 1 Puff by inhalation every twelve (12) hours. PRAVASTATIN (PRAVACHOL) 40 MG TABLET    Take 40 mg by mouth nightly.    These Medications have changed    No medications on file   Stop Taking    No medications on file     _______________________________    Notes:    Dejah Nash MD using Dragon dictation      _______________________________

## 2020-05-23 LAB
CALCULATED R AXIS, ECG10: 41 DEGREES
CALCULATED T AXIS, ECG11: 38 DEGREES
DIAGNOSIS, 93000: NORMAL
Q-T INTERVAL, ECG07: 428 MS
QRS DURATION, ECG06: 84 MS
QTC CALCULATION (BEZET), ECG08: 409 MS
VENTRICULAR RATE, ECG03: 55 BPM

## 2020-05-23 NOTE — DISCHARGE INSTRUCTIONS

## (undated) DEVICE — TRAY PREP DRY W/ PREM GLV 2 APPL 6 SPNG 2 UNDPD 1 OVERWRAP

## (undated) DEVICE — DEPAUL BREAST PLASTIC PACK: Brand: MEDLINE INDUSTRIES, INC.

## (undated) DEVICE — SKIN MARKER,REGULAR TIP WITH RULER AND LABELS: Brand: DEVON

## (undated) DEVICE — DRESSING,GAUZE,XEROFORM,CURAD,5"X9",ST: Brand: CURAD

## (undated) DEVICE — SOL IRR STRL H2O 1000ML BTL --

## (undated) DEVICE — TOWEL SURG W16XL26IN BLU NONFENESTRATED DLX ST 2 PER PK

## (undated) DEVICE — STERILE LATEX POWDER-FREE SURGICAL GLOVESWITH NITRILE COATING: Brand: PROTEXIS

## (undated) DEVICE — SPONGE LAP 18X18IN STRL -- 5/PK

## (undated) DEVICE — MARKER,SKIN,WI/RULER AND LABELS: Brand: MEDLINE

## (undated) DEVICE — STAPLER SKIN H3.9MM WIRE DIA0.58MM CRWN 6.9MM 35 STPL FIX

## (undated) DEVICE — PADDING CAST W6INXL4YD ST COT COHESIVE HND TEARABLE SPEC

## (undated) DEVICE — SUT ETHLN 3-0 18IN PS1 BLK --

## (undated) DEVICE — CARRIER PLATE F/BA720R FACTOR 1.5: Brand: AESCULAP

## (undated) DEVICE — SPONGE: LAP 18X18 W  200/CS: Brand: MEDICAL ACTION INDUSTRIES

## (undated) DEVICE — SPONGE GZ W4XL4IN COT 12 PLY TYP VII WVN C FLD DSGN

## (undated) DEVICE — PAD,ABDOMINAL,5"X9",ST,LF,25/BX: Brand: MEDLINE INDUSTRIES, INC.

## (undated) DEVICE — REM POLYHESIVE ADULT PATIENT RETURN ELECTRODE: Brand: VALLEYLAB

## (undated) DEVICE — GAUZE,SPONGE,4"X4",12PLY,STERILE,LF,2'S: Brand: MEDLINE

## (undated) DEVICE — KENDALL SCD EXPRESS SLEEVES, KNEE LENGTH, MEDIUM: Brand: KENDALL SCD

## (undated) DEVICE — PADDING CAST W4INXL4YD ST COT COHESIVE HND TEARABLE SPEC

## (undated) DEVICE — BANDAGE,GAUZE,BULKEE II,4.5"X4.1YD,STRL: Brand: MEDLINE

## (undated) DEVICE — BLADE, DERMATOME (FOR MODELS S, SB, B AND C)(6 LANGUAGES): Brand: INTEGRA® PADGETT® DERMATOMES

## (undated) DEVICE — X-RAY DETECTABLE SPONGES,12 PLY: Brand: VISTEC